# Patient Record
Sex: FEMALE | Race: WHITE | NOT HISPANIC OR LATINO | Employment: OTHER | URBAN - METROPOLITAN AREA
[De-identification: names, ages, dates, MRNs, and addresses within clinical notes are randomized per-mention and may not be internally consistent; named-entity substitution may affect disease eponyms.]

---

## 2017-05-05 ENCOUNTER — RECORDS - HEALTHEAST (OUTPATIENT)
Dept: LAB | Facility: CLINIC | Age: 77
End: 2017-05-05

## 2017-05-05 LAB
CHOLEST SERPL-MCNC: 286 MG/DL
FASTING STATUS PATIENT QL REPORTED: NO
HDLC SERPL-MCNC: 34 MG/DL
LDLC SERPL CALC-MCNC: 182 MG/DL
LDLC SERPL CALC-MCNC: ABNORMAL MG/DL
TRIGL SERPL-MCNC: 471 MG/DL

## 2018-05-07 ENCOUNTER — RECORDS - HEALTHEAST (OUTPATIENT)
Dept: LAB | Facility: CLINIC | Age: 78
End: 2018-05-07

## 2018-05-07 LAB
ALBUMIN SERPL-MCNC: 3.6 G/DL (ref 3.5–5)
ALP SERPL-CCNC: 89 U/L (ref 45–120)
ALT SERPL W P-5'-P-CCNC: 26 U/L (ref 0–45)
ANION GAP SERPL CALCULATED.3IONS-SCNC: 12 MMOL/L (ref 5–18)
AST SERPL W P-5'-P-CCNC: 23 U/L (ref 0–40)
BILIRUB SERPL-MCNC: 0.5 MG/DL (ref 0–1)
BUN SERPL-MCNC: 19 MG/DL (ref 8–28)
CALCIUM SERPL-MCNC: 9.4 MG/DL (ref 8.5–10.5)
CHLORIDE BLD-SCNC: 108 MMOL/L (ref 98–107)
CHOLEST SERPL-MCNC: 317 MG/DL
CO2 SERPL-SCNC: 22 MMOL/L (ref 22–31)
CREAT SERPL-MCNC: 1.19 MG/DL (ref 0.6–1.1)
FASTING STATUS PATIENT QL REPORTED: NO
GFR SERPL CREATININE-BSD FRML MDRD: 44 ML/MIN/1.73M2
GLUCOSE BLD-MCNC: 112 MG/DL (ref 70–125)
HDLC SERPL-MCNC: 36 MG/DL
LDLC SERPL CALC-MCNC: 186 MG/DL
LDLC SERPL CALC-MCNC: ABNORMAL MG/DL
POTASSIUM BLD-SCNC: 4.3 MMOL/L (ref 3.5–5)
PROT SERPL-MCNC: 7.5 G/DL (ref 6–8)
SODIUM SERPL-SCNC: 142 MMOL/L (ref 136–145)
TRIGL SERPL-MCNC: 581 MG/DL

## 2018-09-27 ENCOUNTER — HOSPITAL ENCOUNTER (OUTPATIENT)
Dept: MAMMOGRAPHY | Facility: CLINIC | Age: 78
Discharge: HOME | End: 2018-09-27
Attending: FAMILY MEDICINE

## 2018-09-27 DIAGNOSIS — Z12.31 VISIT FOR SCREENING MAMMOGRAM: ICD-10-CM

## 2019-05-09 ENCOUNTER — RECORDS - HEALTHEAST (OUTPATIENT)
Dept: LAB | Facility: CLINIC | Age: 79
End: 2019-05-09

## 2019-05-09 LAB
ALBUMIN SERPL-MCNC: 3.4 G/DL (ref 3.5–5)
ALP SERPL-CCNC: 81 U/L (ref 45–120)
ALT SERPL W P-5'-P-CCNC: 20 U/L (ref 0–45)
ANION GAP SERPL CALCULATED.3IONS-SCNC: 11 MMOL/L (ref 5–18)
AST SERPL W P-5'-P-CCNC: 22 U/L (ref 0–40)
BILIRUB SERPL-MCNC: 0.6 MG/DL (ref 0–1)
BUN SERPL-MCNC: 13 MG/DL (ref 8–28)
CALCIUM SERPL-MCNC: 9.6 MG/DL (ref 8.5–10.5)
CHLORIDE BLD-SCNC: 108 MMOL/L (ref 98–107)
CHOLEST SERPL-MCNC: 261 MG/DL
CO2 SERPL-SCNC: 22 MMOL/L (ref 22–31)
CREAT SERPL-MCNC: 1.25 MG/DL (ref 0.6–1.1)
ERYTHROCYTE [DISTWIDTH] IN BLOOD BY AUTOMATED COUNT: 12.7 % (ref 11–14.5)
FASTING STATUS PATIENT QL REPORTED: YES
GFR SERPL CREATININE-BSD FRML MDRD: 42 ML/MIN/1.73M2
GLUCOSE BLD-MCNC: 115 MG/DL (ref 70–125)
HCT VFR BLD AUTO: 42.2 % (ref 35–47)
HDLC SERPL-MCNC: 34 MG/DL
HGB BLD-MCNC: 14.1 G/DL (ref 12–16)
LDLC SERPL CALC-MCNC: 174 MG/DL
MCH RBC QN AUTO: 31 PG (ref 27–34)
MCHC RBC AUTO-ENTMCNC: 33.4 G/DL (ref 32–36)
MCV RBC AUTO: 93 FL (ref 80–100)
PLATELET # BLD AUTO: 291 THOU/UL (ref 140–440)
PMV BLD AUTO: 10.9 FL (ref 8.5–12.5)
POTASSIUM BLD-SCNC: 4.5 MMOL/L (ref 3.5–5)
PROT SERPL-MCNC: 6.8 G/DL (ref 6–8)
RBC # BLD AUTO: 4.55 MILL/UL (ref 3.8–5.4)
SODIUM SERPL-SCNC: 141 MMOL/L (ref 136–145)
TRIGL SERPL-MCNC: 266 MG/DL
WBC: 9.6 THOU/UL (ref 4–11)

## 2019-05-10 LAB — HBA1C MFR BLD: 6.1 % (ref 4.2–6.1)

## 2019-10-08 ENCOUNTER — RECORDS - HEALTHEAST (OUTPATIENT)
Dept: LAB | Facility: CLINIC | Age: 79
End: 2019-10-08

## 2019-10-08 LAB
CHOLEST SERPL-MCNC: 253 MG/DL
FASTING STATUS PATIENT QL REPORTED: YES
HDLC SERPL-MCNC: 44 MG/DL
LDLC SERPL CALC-MCNC: 174 MG/DL
TRIGL SERPL-MCNC: 177 MG/DL

## 2020-03-06 ENCOUNTER — RECORDS - HEALTHEAST (OUTPATIENT)
Dept: LAB | Facility: CLINIC | Age: 80
End: 2020-03-06

## 2020-03-06 LAB
ALBUMIN SERPL-MCNC: 3.6 G/DL (ref 3.5–5)
ALP SERPL-CCNC: 82 U/L (ref 45–120)
ALT SERPL W P-5'-P-CCNC: 21 U/L (ref 0–45)
ANION GAP SERPL CALCULATED.3IONS-SCNC: 13 MMOL/L (ref 5–18)
AST SERPL W P-5'-P-CCNC: 22 U/L (ref 0–40)
BILIRUB SERPL-MCNC: 0.7 MG/DL (ref 0–1)
BNP SERPL-MCNC: 82 PG/ML (ref 0–144)
BUN SERPL-MCNC: 15 MG/DL (ref 8–28)
CALCIUM SERPL-MCNC: 9.6 MG/DL (ref 8.5–10.5)
CHLORIDE BLD-SCNC: 106 MMOL/L (ref 98–107)
CO2 SERPL-SCNC: 21 MMOL/L (ref 22–31)
CREAT SERPL-MCNC: 1.36 MG/DL (ref 0.6–1.1)
GFR SERPL CREATININE-BSD FRML MDRD: 38 ML/MIN/1.73M2
GLUCOSE BLD-MCNC: 125 MG/DL (ref 70–125)
MAGNESIUM SERPL-MCNC: 1.6 MG/DL (ref 1.8–2.6)
PHOSPHATE SERPL-MCNC: 2.6 MG/DL (ref 2.5–4.5)
POTASSIUM BLD-SCNC: 4.2 MMOL/L (ref 3.5–5)
PROT SERPL-MCNC: 7.1 G/DL (ref 6–8)
SODIUM SERPL-SCNC: 140 MMOL/L (ref 136–145)
TSH SERPL DL<=0.005 MIU/L-ACNC: 1.55 UIU/ML (ref 0.3–5)

## 2020-07-21 ENCOUNTER — RECORDS - HEALTHEAST (OUTPATIENT)
Dept: LAB | Facility: CLINIC | Age: 80
End: 2020-07-21

## 2020-07-21 LAB
ANION GAP SERPL CALCULATED.3IONS-SCNC: 9 MMOL/L (ref 5–18)
BUN SERPL-MCNC: 18 MG/DL (ref 8–28)
CALCIUM SERPL-MCNC: 9.7 MG/DL (ref 8.5–10.5)
CHLORIDE BLD-SCNC: 108 MMOL/L (ref 98–107)
CHOLEST SERPL-MCNC: 298 MG/DL
CO2 SERPL-SCNC: 23 MMOL/L (ref 22–31)
CREAT SERPL-MCNC: 1.24 MG/DL (ref 0.6–1.1)
FASTING STATUS PATIENT QL REPORTED: YES
GFR SERPL CREATININE-BSD FRML MDRD: 42 ML/MIN/1.73M2
GLUCOSE BLD-MCNC: 101 MG/DL (ref 70–125)
HDLC SERPL-MCNC: 38 MG/DL
LDLC SERPL CALC-MCNC: 213 MG/DL
POTASSIUM BLD-SCNC: 4.3 MMOL/L (ref 3.5–5)
SODIUM SERPL-SCNC: 140 MMOL/L (ref 136–145)
TRIGL SERPL-MCNC: 234 MG/DL

## 2021-01-29 ENCOUNTER — RECORDS - HEALTHEAST (OUTPATIENT)
Dept: LAB | Facility: CLINIC | Age: 81
End: 2021-01-29

## 2021-01-29 LAB
ALBUMIN SERPL-MCNC: 3.8 G/DL (ref 3.5–5)
ALP SERPL-CCNC: 87 U/L (ref 45–120)
ALT SERPL W P-5'-P-CCNC: 22 U/L (ref 0–45)
ANION GAP SERPL CALCULATED.3IONS-SCNC: 11 MMOL/L (ref 5–18)
AST SERPL W P-5'-P-CCNC: 20 U/L (ref 0–40)
BILIRUB SERPL-MCNC: 0.7 MG/DL (ref 0–1)
BUN SERPL-MCNC: 18 MG/DL (ref 8–28)
CALCIUM SERPL-MCNC: 9.3 MG/DL (ref 8.5–10.5)
CHLORIDE BLD-SCNC: 105 MMOL/L (ref 98–107)
CO2 SERPL-SCNC: 24 MMOL/L (ref 22–31)
CREAT SERPL-MCNC: 1.39 MG/DL (ref 0.6–1.1)
GFR SERPL CREATININE-BSD FRML MDRD: 37 ML/MIN/1.73M2
GLUCOSE BLD-MCNC: 116 MG/DL (ref 70–125)
POTASSIUM BLD-SCNC: 4.5 MMOL/L (ref 3.5–5)
PROT SERPL-MCNC: 7.3 G/DL (ref 6–8)
SODIUM SERPL-SCNC: 140 MMOL/L (ref 136–145)

## 2021-03-10 ENCOUNTER — RECORDS - HEALTHEAST (OUTPATIENT)
Dept: ADMINISTRATIVE | Facility: OTHER | Age: 81
End: 2021-03-10

## 2021-03-11 ENCOUNTER — HOSPITAL ENCOUNTER (OUTPATIENT)
Dept: CARDIOLOGY | Facility: CLINIC | Age: 81
Discharge: HOME | End: 2021-03-11

## 2021-03-11 DIAGNOSIS — R00.1 BRADYCARDIA: ICD-10-CM

## 2021-03-16 ENCOUNTER — RECORDS - HEALTHEAST (OUTPATIENT)
Dept: ADMINISTRATIVE | Facility: OTHER | Age: 81
End: 2021-03-16

## 2021-03-16 ENCOUNTER — AMBULATORY - HEALTHEAST (OUTPATIENT)
Dept: CARDIOLOGY | Facility: CLINIC | Age: 81
End: 2021-03-16

## 2021-03-17 ENCOUNTER — OFFICE VISIT - HEALTHEAST (OUTPATIENT)
Dept: CARDIOLOGY | Facility: CLINIC | Age: 81
End: 2021-03-17

## 2021-03-17 DIAGNOSIS — R06.09 DYSPNEA ON EXERTION: ICD-10-CM

## 2021-03-17 RX ORDER — MECOBALAMIN 5000 MCG
15 TABLET,DISINTEGRATING ORAL DAILY
Status: SHIPPED | COMMUNITY
Start: 2020-03-12

## 2021-03-17 RX ORDER — LOSARTAN POTASSIUM 50 MG/1
1 TABLET ORAL DAILY
Status: SHIPPED | COMMUNITY
Start: 2021-03-10 | End: 2022-09-23

## 2021-03-17 ASSESSMENT — MIFFLIN-ST. JEOR: SCORE: 1462.45

## 2021-03-26 ENCOUNTER — HOSPITAL ENCOUNTER (OUTPATIENT)
Dept: NUCLEAR MEDICINE | Facility: CLINIC | Age: 81
Discharge: HOME | End: 2021-03-26
Attending: INTERNAL MEDICINE

## 2021-03-26 ENCOUNTER — HOSPITAL ENCOUNTER (OUTPATIENT)
Dept: CARDIOLOGY | Facility: CLINIC | Age: 81
Discharge: HOME | End: 2021-03-26
Attending: INTERNAL MEDICINE

## 2021-03-26 LAB
CV STRESS CURRENT BP HE: NORMAL
CV STRESS CURRENT HR HE: 102
CV STRESS CURRENT HR HE: 104
CV STRESS CURRENT HR HE: 105
CV STRESS CURRENT HR HE: 106
CV STRESS CURRENT HR HE: 106
CV STRESS CURRENT HR HE: 107
CV STRESS CURRENT HR HE: 108
CV STRESS CURRENT HR HE: 109
CV STRESS CURRENT HR HE: 112
CV STRESS CURRENT HR HE: 112
CV STRESS CURRENT HR HE: 113
CV STRESS CURRENT HR HE: 114
CV STRESS CURRENT HR HE: 90
CV STRESS CURRENT HR HE: 95
CV STRESS CURRENT HR HE: 99
CV STRESS DEVIATION TIME HE: NORMAL
CV STRESS ECHO PERCENT HR HE: NORMAL
CV STRESS EXERCISE STAGE HE: NORMAL
CV STRESS FINAL RESTING BP HE: NORMAL
CV STRESS FINAL RESTING HR HE: 99
CV STRESS MAX HR HE: 114
CV STRESS MAX TREADMILL GRADE HE: 0
CV STRESS MAX TREADMILL SPEED HE: 0
CV STRESS PEAK DIA BP HE: NORMAL
CV STRESS PEAK SYS BP HE: NORMAL
CV STRESS PHASE HE: NORMAL
CV STRESS PROTOCOL HE: NORMAL
CV STRESS RESTING PT POSITION HE: NORMAL
CV STRESS RESTING PT POSITION HE: NORMAL
CV STRESS ST DEVIATION AMOUNT HE: NORMAL
CV STRESS ST DEVIATION ELEVATION HE: NORMAL
CV STRESS ST EVELATION AMOUNT HE: NORMAL
CV STRESS TEST TYPE HE: NORMAL
CV STRESS TOTAL STAGE TIME MIN 1 HE: NORMAL
RATE PRESSURE PRODUCT: NORMAL
STRESS ECHO BASELINE DIASTOLIC HE: 72
STRESS ECHO BASELINE HR: 93
STRESS ECHO BASELINE SYSTOLIC BP: 158
STRESS ECHO CALCULATED PERCENT HR: 80 %
STRESS ECHO LAST STRESS DIASTOLIC BP: 88
STRESS ECHO LAST STRESS HR: 105
STRESS ECHO LAST STRESS SYSTOLIC BP: 128
STRESS ECHO TARGET HR: 142

## 2021-04-15 ENCOUNTER — OFFICE VISIT - HEALTHEAST (OUTPATIENT)
Dept: CARDIOLOGY | Facility: CLINIC | Age: 81
End: 2021-04-15

## 2021-04-15 DIAGNOSIS — I49.3 PVC'S (PREMATURE VENTRICULAR CONTRACTIONS): ICD-10-CM

## 2021-04-15 DIAGNOSIS — E78.5 HYPERLIPIDEMIA LDL GOAL <70: ICD-10-CM

## 2021-04-15 RX ORDER — ATENOLOL 50 MG/1
50 TABLET ORAL DAILY
Qty: 90 TABLET | Refills: 3 | Status: SHIPPED
Start: 2021-04-15 | End: 2023-04-20

## 2021-04-15 ASSESSMENT — MIFFLIN-ST. JEOR: SCORE: 1453.37

## 2021-04-19 ENCOUNTER — COMMUNICATION - HEALTHEAST (OUTPATIENT)
Dept: CARDIOLOGY | Facility: CLINIC | Age: 81
End: 2021-04-19

## 2021-04-20 ENCOUNTER — COMMUNICATION - HEALTHEAST (OUTPATIENT)
Dept: ENDOCRINOLOGY | Facility: CLINIC | Age: 81
End: 2021-04-20

## 2021-04-21 ENCOUNTER — COMMUNICATION - HEALTHEAST (OUTPATIENT)
Dept: CARDIOLOGY | Facility: CLINIC | Age: 81
End: 2021-04-21

## 2021-04-29 ENCOUNTER — HOSPITAL ENCOUNTER (OUTPATIENT)
Dept: CARDIOLOGY | Facility: CLINIC | Age: 81
Discharge: HOME | End: 2021-04-29
Attending: INTERNAL MEDICINE

## 2021-04-29 DIAGNOSIS — I49.3 PVC'S (PREMATURE VENTRICULAR CONTRACTIONS): ICD-10-CM

## 2021-05-24 ENCOUNTER — RECORDS - HEALTHEAST (OUTPATIENT)
Dept: ADMINISTRATIVE | Facility: CLINIC | Age: 81
End: 2021-05-24

## 2021-05-25 ENCOUNTER — RECORDS - HEALTHEAST (OUTPATIENT)
Dept: ADMINISTRATIVE | Facility: CLINIC | Age: 81
End: 2021-05-25

## 2021-05-26 ENCOUNTER — RECORDS - HEALTHEAST (OUTPATIENT)
Dept: ADMINISTRATIVE | Facility: CLINIC | Age: 81
End: 2021-05-26

## 2021-05-27 ENCOUNTER — RECORDS - HEALTHEAST (OUTPATIENT)
Dept: ADMINISTRATIVE | Facility: CLINIC | Age: 81
End: 2021-05-27

## 2021-05-28 ENCOUNTER — RECORDS - HEALTHEAST (OUTPATIENT)
Dept: ADMINISTRATIVE | Facility: CLINIC | Age: 81
End: 2021-05-28

## 2021-05-29 ENCOUNTER — RECORDS - HEALTHEAST (OUTPATIENT)
Dept: ADMINISTRATIVE | Facility: CLINIC | Age: 81
End: 2021-05-29

## 2021-05-30 ENCOUNTER — RECORDS - HEALTHEAST (OUTPATIENT)
Dept: ADMINISTRATIVE | Facility: CLINIC | Age: 81
End: 2021-05-30

## 2021-06-02 ENCOUNTER — RECORDS - HEALTHEAST (OUTPATIENT)
Dept: ADMINISTRATIVE | Facility: CLINIC | Age: 81
End: 2021-06-02

## 2021-06-05 VITALS
RESPIRATION RATE: 16 BRPM | WEIGHT: 207 LBS | HEART RATE: 120 BPM | HEIGHT: 68 IN | BODY MASS INDEX: 31.37 KG/M2 | DIASTOLIC BLOOD PRESSURE: 82 MMHG | SYSTOLIC BLOOD PRESSURE: 130 MMHG

## 2021-06-05 VITALS
BODY MASS INDEX: 31.07 KG/M2 | SYSTOLIC BLOOD PRESSURE: 130 MMHG | HEIGHT: 68 IN | HEART RATE: 60 BPM | DIASTOLIC BLOOD PRESSURE: 86 MMHG | RESPIRATION RATE: 16 BRPM | WEIGHT: 205 LBS

## 2021-06-16 NOTE — TELEPHONE ENCOUNTER
Lorraine, got this message from the provider for REpatha. Please check if anything is needed. I have gotten nothing from Pharm.

## 2021-06-16 NOTE — TELEPHONE ENCOUNTER
----- Message from Theresa Ortiz MD sent at 4/18/2021  4:47 PM CDT -----  I sent in a script for repatha  for this patient with hyperlipidemia, cad and statin intolerance - could help with prior auth

## 2021-06-16 NOTE — TELEPHONE ENCOUNTER
Viki called me back stating that she has decided not to proceed with this medication and she is pretty darn sure should wouldn't qualify for the laina due to her income.

## 2021-06-17 NOTE — TELEPHONE ENCOUNTER
Telephone Encounter by Lorraine Daley at 4/19/2021 11:35 AM     Author: Lorraine Daley Service: -- Author Type: Financial Resource Guide    Filed: 4/19/2021 11:39 AM Encounter Date: 4/19/2021 Status: Signed    : Lorraine Daley (Financial Resource Guide)       PA Initiation  Medication: repatha sureclick 140mg/ml  QTY: 2 pens for 28 days  Insurance Company: [employer] Part D ([employer])  Pharmacy Filing Rx: pending  Filling Pharmacy Phone: [email or web address]  Filling Pharmacy Fax: na  Start Date: 4/19/21  Additional Information: jaden

## 2021-06-17 NOTE — TELEPHONE ENCOUNTER
Telephone Encounter by Lorraine Daley at 4/19/2021  1:21 PM     Author: Lorraine Daley Service: -- Author Type: Financial Resource Guide    Filed: 4/20/2021 12:28 PM Encounter Date: 4/19/2021 Status: Addendum    : Lorraine Daley (Financial Resource Guide)    Related Notes: Original Note by Lorraine Daley (Financial Resource Guide) filed at 4/19/2021  3:14 PM       Prior Authorization Approval  Medication: Repatha sureclick 140mg/ml  Qty: 2 pens for 28 days  Effective Dates: 3/20/21 - 4/18/24  Reference Number: na  Insurance Company: UC[employer] PART D ([employer])  [facility]: [facility]  Expected Copay: $445.00  Copay Card Available: not elg  Foundation Assistance Needed/Available: Yes  Patient Assistance Program Needed: No  Patient Notified? Yes, LM [facility]  Pharmacy Notified? Yes

## 2021-06-18 NOTE — PATIENT INSTRUCTIONS - HE
Patient Instructions by Theresa Ortiz MD at 4/15/2021 11:30 AM     Author: Theresa Ortiz MD Service: -- Author Type: Physician    Filed: 4/15/2021 11:51 AM Encounter Date: 4/15/2021 Status: Signed    : Theresa Ortiz MD (Physician)       MsGenny Viki MARSHALL Jakob,     It was a pleasure to see you in the office today. My recommendations for you include:   1. PCSK9 inhibitor for cholesterol  2. Start atenolol 50 mg daily and stop toprol XL      Please do not hesitate to call the [facility] clinic with any questions or concerns at [phone].    Sincerely,

## 2021-06-26 ENCOUNTER — HEALTH MAINTENANCE LETTER (OUTPATIENT)
Age: 81
End: 2021-06-26

## 2021-06-30 NOTE — PROGRESS NOTES
Progress Notes by Theresa Ortiz MD at 4/15/2021 11:30 AM     Author: Theresa Ortiz MD Service: -- Author Type: Physician    Filed: 4/18/2021  4:55 PM Encounter Date: 4/15/2021 Status: Signed    : Theresa Ortiz MD (Physician)           Thank you, Dr. Bundy, for asking us to see Viki Robert at the [facility].      Assessment/Recommendations   Assessment:    1.  Premature ventricular contractions:   Increased burden on recent holter after stopping her beta blocker.  She did not tolerate metoprolol . Will resume atenolol at a lower dose.  She had been on 100mg and will start 50 mg.  No bradcardia noted on holter.  Will repeat holter after start the atenolol.   2. Hypertension: well controlled  3. Dyslipidemia: severe hyperlipidemia with statin intolerance.   Recommend considering PCSK9 inhibitor  4. Coronary artery disease with CT cardiac calcium score of 23 2015  5. Obesity     Plan:  1. Start atenolol 50 mg daily and repeat holter  2. start PCSK 9 inhibitor.  Sent script for repatha  3. Follow up in one year       History of Present Illness    Ms. Viki Robert is a 80 y.o. female with history of PVCs, severe dyslipidemia with statin and zetia intolerance and hypertension who I am seeing for a follow up visit.   She noticed bradycardia with heart rates in the 40s and occasional irregular heart beat so she stopped her atenolol.   Holter monitor on 3/15/21 showed very frequent PVCs 16% burden with ventricular bigeminy.   PVC morphology consistent with origin from the right ventricle.  Short runs of atrial tachycardia were also seen.   7/21/20 lipids show , HDL 38, .  She gained weight with zetia and did not tolerate statins.  I started her on low dose metoprolol and she suffered from nightmares.  Lexiscan nuclear stress test was negative for inducible ischemia. CT cardiac calcium score of 23 on 1/26/2015.  No complaints of chest pain.             Physical Examination Review of Systems   Vitals:    04/15/21 1127   BP: 130/86   Pulse: 60   Resp: 16     Body mass index is 31.17 kg/m .  Wt Readings from Last 3 Encounters:   04/15/21 205 lb (93 kg)   03/17/21 207 lb (93.9 kg)       General Appearance:   alert, no apparent distress   HEENT:  no scleral icterus; the mucous membranes are pink and moist                                  Neck: No jvd   Chest: the spine is straight and the chest is symmetric   Lungs:   respirations unlabored; the lungs are clear to auscultation   Cardiovascular:   regular rhythm with normal first and second heart sounds and no murmurs or gallops   Abdomen:  no organomegaly, masses, bruits, or tenderness; bowel sounds are present   Extremities: no edema   Skin: no xanthelasma    General: WNL  Eyes: WNL  Ears/Nose/Throat: WNL  Lungs: Shortness of Breath  Heart: Shortness of Breath with activity, Irregular Heartbeat, Leg Swelling  Stomach: WNL  Bladder: WNL  Muscle/Joints: Muscle Weakness  Skin: WNL  Nervous System: WNL  Mental Health: WNL     Blood: WNL     Medical History  Surgical History Family History Social History   Hyperlipidemia    Hypertension    PVC Past Surgical History:   Procedure Laterality Date   ? BREAST BIOPSY Left 2000    Family History   Problem Relation Age of Onset   ? Breast cancer Mother 90   ? Multiple myeloma Father 75   ? Breast cancer Paternal Grandmother 100    Social History     Socioeconomic History   ? Marital status: Single     Spouse name: Not on file   ? Number of children: Not on file   ? Years of education: Not on file   ? Highest education level: Not on file   Occupational History   ? Not on file   Social Needs   ? Financial resource strain: Not on file   ? Food insecurity     Worry: Not on file     Inability: Not on file   ? Transportation needs     Medical: Not on file     Non-medical: Not on file   Tobacco Use   ? Smoking status: Never Smoker   ? Smokeless tobacco: Never Used   Substance and  Sexual Activity   ? Alcohol use: Not on file   ? Drug use: Never   ? Sexual activity: Not on file   Lifestyle   ? Physical activity     Days per week: Not on file     Minutes per session: Not on file   ? Stress: Not on file   Relationships   ? Social connections     Talks on phone: Not on file     Gets together: Not on file     Attends Zoroastrian service: Not on file     Active member of club or organization: Not on file     Attends meetings of clubs or organizations: Not on file     Relationship status: Not on file   ? Intimate partner violence     Fear of current or ex partner: Not on file     Emotionally abused: Not on file     Physically abused: Not on file     Forced sexual activity: Not on file   Other Topics Concern   ? Not on file   Social History Narrative   ? Not on file          Medications  Allergies   Current Outpatient Medications   Medication Sig Dispense Refill   ? aspirin 81 MG EC tablet Take 81 mg by mouth daily.     ? cholecalciferol, vitamin D3, 1,000 unit (25 mcg) tablet Take 1,000 Units by mouth daily.     ? lansoprazole (PREVACID) 15 MG capsule Take 15 mg by mouth daily.     ? losartan (COZAAR) 50 MG tablet Take 1 tablet by mouth daily.     ? multivitamin (ONE A DAY) per tablet Take 1 tablet by mouth daily.     ? atenoloL (TENORMIN) 50 MG tablet Take 1 tablet (50 mg total) by mouth daily. 90 tablet 3   ? evolocumab (REPATHA SURECLICK) 140 mg/mL PnIj Inject 1 click under the skin every 14 (fourteen) days. 10 Syringe 5     No current facility-administered medications for this visit.       Allergies   Allergen Reactions   ? Atorvastatin      Achy & weak   ? Hydrochlorothiazide      Light headed   ? Lodine [Etodolac]    ? Naprosyn [Naproxen]      Ankle swelling   ? Pravachol [Pravastatin]      Muscle aches & weakness         Lab Results    Chemistry/lipid CBC Cardiac Enzymes/BNP/TSH/INR   Lab Results   Component Value Date    CHOL 298 (H) 07/21/2020    HDL 38 (L) 07/21/2020    LDLCALC 213 (H)  07/21/2020    TRIG 234 (H) 07/21/2020    CREATININE 1.39 (H) 01/29/2021    BUN 18 01/29/2021    K 4.5 01/29/2021     01/29/2021     01/29/2021    CO2 24 01/29/2021    Lab Results   Component Value Date    WBC 9.6 05/09/2019    HGB 14.1 05/09/2019    HCT 42.2 05/09/2019    MCV 93 05/09/2019     05/09/2019    Lab Results   Component Value Date    CKTOTAL 76 02/09/2015    BNP 82 03/06/2020    TSH 1.55 03/06/2020

## 2021-06-30 NOTE — PROGRESS NOTES
Progress Notes by Theresa Ortiz MD at 3/17/2021  3:10 PM     Author: Theresa Ortiz MD Service: -- Author Type: Physician    Filed: 3/27/2021  4:29 PM Encounter Date: 3/17/2021 Status: Signed    : Theresa Ortiz MD (Physician)           Thank you, Dr. Bundy, for asking us to see Viki Robert at the [facility].      Assessment/Recommendations   Assessment:    1.  Premature ventricular contractions:   Increased burden on recent holter after stopping her beta blocker.  Bradycardia she noted was likely due to the bigeminy.   She did not have any bradycardia on her holter.  Recommend resuming beta blocker. If symptomatic we could consider referral to EP for ablation.   2. Hypertension: well controlled  3. Dyslipidemia: severe hyperlipidemia with statin intolerance.   Recommend considering PCSK9 inhibitor  4. Dyspnea on exertion with normal PFTs and no significant structural heart disease on echo  5. Obesity    Plan:  1. Start toprol XL 25 mg daily  2. Lexiscan nuclear stress test  3. Follow up in a month       History of Present Illness    Ms. Viki Robert is a 80 y.o. female with history of PVCs, severe dyslipidemia with statin and zetia intolerance and hypertension who I am seeing for an initial consultation.   She noticed bradycardia with heart rates in the 40s and occasional irregular heart beat so she stopped her atenolol.   Holter monitor on 3/15/21 showed very frequent PVCs 16% burden with ventricular bigeminy.   PVC morphology consistent with origin from the right ventricle.  Short runs of atrial tachycardia were also seen.  She denies chest pain.  She has noted worsening shortness of breath over the past two years.    7/21/20 lipids show , HDL 38, .  She gained weight with zetia and did not tolerate statins.      [facility]      Date: 6/10/2020     Referring Physician: Bulmaro Powers MD    Indication:  Dyspnea on exertion, heart palpitations     CONCLUSIONS:  1.  Normal left ventricular size with normal systolic performance with   visually estimated ejection fraction of 60% to 65%.   2.  No regional wall motion abnormalities are noted.   3.  Normal right ventricular size and systolic performance.   4.  Mild senile aortic valve sclerosis without stenosis.   5.  Mild mitral annular calcification with trace insufficiency.        Physical Examination Review of Systems   Vitals:    03/17/21 1512   BP: 130/82   Pulse: (!) 120   Resp: 16     Body mass index is 31.47 kg/m .  Wt Readings from Last 3 Encounters:   03/17/21 207 lb (93.9 kg)       General Appearance:   alert, no apparent distress   HEENT:  no scleral icterus; the mucous membranes are pink and moist                                  Neck: No jvd   Chest: the spine is straight and the chest is symmetric   Lungs:   respirations unlabored; the lungs are clear to auscultation   Cardiovascular:   regular rhythm with normal first and second heart sounds and no murmurs or gallops   Abdomen:  no organomegaly, masses, bruits, or tenderness; bowel sounds are present   Extremities: no cyanosis, clubbing, or edema   Skin: no xanthelasma    General: WNL  Eyes: WNL  Ears/Nose/Throat: WNL  Lungs: Shortness of Breath  Heart: Shortness of Breath with activity, Irregular Heartbeat  Stomach: WNL  Bladder: Frequent Urination at Night  Muscle/Joints: Muscle Weakness  Skin: WNL  Nervous System: WNL  Mental Health: WNL     Blood: WNL     Medical History  Surgical History Family History Social History   Hypertension  PVCs Past Surgical History:   Procedure Laterality Date   ? BREAST BIOPSY Left 2000    Family History   Problem Relation Age of Onset   ? Breast cancer Mother 90   ? Multiple myeloma Father 75   ? Breast cancer Paternal Grandmother 100    Social History     Socioeconomic History   ? Marital status: Single     Spouse name: Not on file   ? Number of children: Not on file    ? Years of education: Not on file   ? Highest education level: Not on file   Occupational History   ? Not on file   Social Needs   ? Financial resource strain: Not on file   ? Food insecurity     Worry: Not on file     Inability: Not on file   ? Transportation needs     Medical: Not on file     Non-medical: Not on file   Tobacco Use   ? Smoking status: Never Smoker   ? Smokeless tobacco: Never Used   Substance and Sexual Activity   ? Alcohol use: Not on file   ? Drug use: Never   ? Sexual activity: Not on file   Lifestyle   ? Physical activity     Days per week: Not on file     Minutes per session: Not on file   ? Stress: Not on file   Relationships   ? Social connections     Talks on phone: Not on file     Gets together: Not on file     Attends Denominational service: Not on file     Active member of club or organization: Not on file     Attends meetings of clubs or organizations: Not on file     Relationship status: Not on file   ? Intimate partner violence     Fear of current or ex partner: Not on file     Emotionally abused: Not on file     Physically abused: Not on file     Forced sexual activity: Not on file   Other Topics Concern   ? Not on file   Social History Narrative   ? Not on file          Medications  Allergies   Current Outpatient Medications   Medication Sig Dispense Refill   ? aspirin 81 MG EC tablet Take 81 mg by mouth daily.     ? cholecalciferol, vitamin D3, 1,000 unit (25 mcg) tablet Take 1,000 Units by mouth daily.     ? lansoprazole (PREVACID) 15 MG capsule Take 15 mg by mouth daily.     ? losartan (COZAAR) 50 MG tablet Take 1 tablet by mouth daily.     ? multivitamin (ONE A DAY) per tablet Take 1 tablet by mouth daily.     ? atenoloL (TENORMIN) 100 MG tablet Take 100 mg by mouth daily.     ? ezetimibe (ZETIA) 10 mg tablet Take 10 mg by mouth daily.     ? metoprolol succinate (TOPROL-XL) 25 MG Take 1 tablet (25 mg total) by mouth daily. 30 tablet 5     No current facility-administered  medications for this visit.       Allergies   Allergen Reactions   ? Atorvastatin      Achy & weak   ? Hydrochlorothiazide      Light headed   ? Lodine [Etodolac]    ? Naprosyn [Naproxen]      Ankle swelling   ? Pravachol [Pravastatin]      Muscle aches & weakness         Lab Results    Chemistry/lipid CBC Cardiac Enzymes/BNP/TSH/INR   Lab Results   Component Value Date    CHOL 298 (H) 07/21/2020    HDL 38 (L) 07/21/2020    LDLCALC 213 (H) 07/21/2020    TRIG 234 (H) 07/21/2020    CREATININE 1.39 (H) 01/29/2021    BUN 18 01/29/2021    K 4.5 01/29/2021     01/29/2021     01/29/2021    CO2 24 01/29/2021    Lab Results   Component Value Date    WBC 9.6 05/09/2019    HGB 14.1 05/09/2019    HCT 42.2 05/09/2019    MCV 93 05/09/2019     05/09/2019    Lab Results   Component Value Date    CKTOTAL 76 02/09/2015    BNP 82 03/06/2020    TSH 1.55 03/06/2020

## 2021-07-13 ENCOUNTER — RECORDS - HEALTHEAST (OUTPATIENT)
Dept: ADMINISTRATIVE | Facility: CLINIC | Age: 81
End: 2021-07-13

## 2021-07-21 ENCOUNTER — RECORDS - HEALTHEAST (OUTPATIENT)
Dept: ADMINISTRATIVE | Facility: CLINIC | Age: 81
End: 2021-07-21

## 2021-07-22 ENCOUNTER — RECORDS - HEALTHEAST (OUTPATIENT)
Dept: SCHEDULING | Facility: CLINIC | Age: 81
End: 2021-07-22

## 2021-07-22 DIAGNOSIS — Z12.31 OTHER SCREENING MAMMOGRAM: ICD-10-CM

## 2021-09-03 ENCOUNTER — LAB REQUISITION (OUTPATIENT)
Dept: LAB | Facility: CLINIC | Age: 81
End: 2021-09-03

## 2021-09-03 DIAGNOSIS — I12.9 HYPERTENSIVE CHRONIC KIDNEY DISEASE WITH STAGE 1 THROUGH STAGE 4 CHRONIC KIDNEY DISEASE, OR UNSPECIFIED CHRONIC KIDNEY DISEASE: ICD-10-CM

## 2021-09-03 DIAGNOSIS — E78.5 HYPERLIPIDEMIA, UNSPECIFIED: ICD-10-CM

## 2021-09-03 DIAGNOSIS — E55.9 VITAMIN D DEFICIENCY, UNSPECIFIED: ICD-10-CM

## 2021-09-03 LAB
ALBUMIN SERPL-MCNC: 3.6 G/DL (ref 3.5–5)
ALP SERPL-CCNC: 72 U/L (ref 45–120)
ALT SERPL W P-5'-P-CCNC: 18 U/L (ref 0–45)
ANION GAP SERPL CALCULATED.3IONS-SCNC: 13 MMOL/L (ref 5–18)
AST SERPL W P-5'-P-CCNC: 19 U/L (ref 0–40)
BILIRUB SERPL-MCNC: 0.5 MG/DL (ref 0–1)
BUN SERPL-MCNC: 18 MG/DL (ref 8–28)
CALCIUM SERPL-MCNC: 9.7 MG/DL (ref 8.5–10.5)
CHLORIDE BLD-SCNC: 108 MMOL/L (ref 98–107)
CHOLEST SERPL-MCNC: 290 MG/DL
CO2 SERPL-SCNC: 20 MMOL/L (ref 22–31)
CREAT SERPL-MCNC: 1.41 MG/DL (ref 0.6–1.1)
FASTING STATUS PATIENT QL REPORTED: ABNORMAL
GFR SERPL CREATININE-BSD FRML MDRD: 35 ML/MIN/1.73M2
GLUCOSE BLD-MCNC: 102 MG/DL (ref 70–125)
HDLC SERPL-MCNC: 38 MG/DL
LDLC SERPL CALC-MCNC: 187 MG/DL
LDLC SERPL CALC-MCNC: ABNORMAL MG/DL
POTASSIUM BLD-SCNC: 4.7 MMOL/L (ref 3.5–5)
PROT SERPL-MCNC: 6.9 G/DL (ref 6–8)
SODIUM SERPL-SCNC: 141 MMOL/L (ref 136–145)
TRIGL SERPL-MCNC: 438 MG/DL

## 2021-09-03 PROCEDURE — 80061 LIPID PANEL: CPT | Performed by: STUDENT IN AN ORGANIZED HEALTH CARE EDUCATION/TRAINING PROGRAM

## 2021-09-03 PROCEDURE — 80053 COMPREHEN METABOLIC PANEL: CPT | Performed by: STUDENT IN AN ORGANIZED HEALTH CARE EDUCATION/TRAINING PROGRAM

## 2021-09-03 PROCEDURE — 83721 ASSAY OF BLOOD LIPOPROTEIN: CPT | Performed by: STUDENT IN AN ORGANIZED HEALTH CARE EDUCATION/TRAINING PROGRAM

## 2021-09-03 PROCEDURE — 82306 VITAMIN D 25 HYDROXY: CPT | Performed by: STUDENT IN AN ORGANIZED HEALTH CARE EDUCATION/TRAINING PROGRAM

## 2021-09-03 PROCEDURE — 36415 COLL VENOUS BLD VENIPUNCTURE: CPT | Performed by: STUDENT IN AN ORGANIZED HEALTH CARE EDUCATION/TRAINING PROGRAM

## 2021-09-06 LAB — DEPRECATED CALCIDIOL+CALCIFEROL SERPL-MC: 27 UG/L (ref 30–80)

## 2021-10-16 ENCOUNTER — HEALTH MAINTENANCE LETTER (OUTPATIENT)
Age: 81
End: 2021-10-16

## 2021-11-04 ENCOUNTER — LAB REQUISITION (OUTPATIENT)
Dept: LAB | Facility: CLINIC | Age: 81
End: 2021-11-04

## 2021-11-04 ENCOUNTER — TRANSFERRED RECORDS (OUTPATIENT)
Dept: HEALTH INFORMATION MANAGEMENT | Facility: CLINIC | Age: 81
End: 2021-11-04

## 2021-11-04 DIAGNOSIS — R29.6 REPEATED FALLS: ICD-10-CM

## 2021-11-04 LAB
ALBUMIN SERPL-MCNC: 3.6 G/DL (ref 3.5–5)
ALP SERPL-CCNC: 72 U/L (ref 45–120)
ALT SERPL W P-5'-P-CCNC: 15 U/L (ref 0–45)
ANION GAP SERPL CALCULATED.3IONS-SCNC: 11 MMOL/L (ref 5–18)
AST SERPL W P-5'-P-CCNC: 16 U/L (ref 0–40)
BILIRUB SERPL-MCNC: 0.6 MG/DL (ref 0–1)
BUN SERPL-MCNC: 24 MG/DL (ref 8–28)
CALCIUM SERPL-MCNC: 9.7 MG/DL (ref 8.5–10.5)
CHLORIDE BLD-SCNC: 108 MMOL/L (ref 98–107)
CO2 SERPL-SCNC: 21 MMOL/L (ref 22–31)
CREAT SERPL-MCNC: 1.49 MG/DL (ref 0.6–1.1)
GFR SERPL CREATININE-BSD FRML MDRD: 33 ML/MIN/1.73M2
GLUCOSE BLD-MCNC: 137 MG/DL (ref 70–125)
POTASSIUM BLD-SCNC: 4.4 MMOL/L (ref 3.5–5)
PROT SERPL-MCNC: 6.9 G/DL (ref 6–8)
SODIUM SERPL-SCNC: 140 MMOL/L (ref 136–145)

## 2021-11-04 PROCEDURE — 80053 COMPREHEN METABOLIC PANEL: CPT | Performed by: STUDENT IN AN ORGANIZED HEALTH CARE EDUCATION/TRAINING PROGRAM

## 2022-01-21 ENCOUNTER — HOSPITAL ENCOUNTER (OUTPATIENT)
Dept: MAMMOGRAPHY | Facility: CLINIC | Age: 82
Discharge: HOME | End: 2022-01-21
Attending: STUDENT IN AN ORGANIZED HEALTH CARE EDUCATION/TRAINING PROGRAM | Admitting: STUDENT IN AN ORGANIZED HEALTH CARE EDUCATION/TRAINING PROGRAM
Payer: COMMERCIAL

## 2022-01-21 DIAGNOSIS — Z12.31 VISIT FOR SCREENING MAMMOGRAM: ICD-10-CM

## 2022-01-21 PROCEDURE — 77067 SCR MAMMO BI INCL CAD: CPT

## 2022-09-02 ENCOUNTER — TRANSFERRED RECORDS (OUTPATIENT)
Dept: HEALTH INFORMATION MANAGEMENT | Facility: CLINIC | Age: 82
End: 2022-09-02

## 2022-09-02 ENCOUNTER — LAB REQUISITION (OUTPATIENT)
Dept: LAB | Facility: CLINIC | Age: 82
End: 2022-09-02

## 2022-09-02 DIAGNOSIS — I10 ESSENTIAL (PRIMARY) HYPERTENSION: ICD-10-CM

## 2022-09-02 DIAGNOSIS — E78.5 HYPERLIPIDEMIA, UNSPECIFIED: ICD-10-CM

## 2022-09-02 LAB
ALBUMIN SERPL BCG-MCNC: 4.2 G/DL (ref 3.5–5.2)
ALP SERPL-CCNC: 75 U/L (ref 35–104)
ALT SERPL W P-5'-P-CCNC: 22 U/L (ref 10–35)
ANION GAP SERPL CALCULATED.3IONS-SCNC: 12 MMOL/L (ref 7–15)
AST SERPL W P-5'-P-CCNC: 23 U/L (ref 10–35)
BILIRUB SERPL-MCNC: 0.7 MG/DL
BUN SERPL-MCNC: 18.1 MG/DL (ref 8–23)
CALCIUM SERPL-MCNC: 9.8 MG/DL (ref 8.8–10.2)
CHLORIDE SERPL-SCNC: 105 MMOL/L (ref 98–107)
CHOLEST SERPL-MCNC: 283 MG/DL
CREAT SERPL-MCNC: 1.33 MG/DL (ref 0.51–0.95)
DEPRECATED HCO3 PLAS-SCNC: 24 MMOL/L (ref 22–29)
GFR SERPL CREATININE-BSD FRML MDRD: 40 ML/MIN/1.73M2
GLUCOSE SERPL-MCNC: 113 MG/DL (ref 70–99)
HDLC SERPL-MCNC: 37 MG/DL
LDLC SERPL CALC-MCNC: 179 MG/DL
NONHDLC SERPL-MCNC: 246 MG/DL
POTASSIUM SERPL-SCNC: 4.2 MMOL/L (ref 3.4–5.3)
PROT SERPL-MCNC: 7 G/DL (ref 6.4–8.3)
SODIUM SERPL-SCNC: 141 MMOL/L (ref 136–145)
TRIGL SERPL-MCNC: 335 MG/DL

## 2022-09-02 PROCEDURE — 80061 LIPID PANEL: CPT | Performed by: STUDENT IN AN ORGANIZED HEALTH CARE EDUCATION/TRAINING PROGRAM

## 2022-09-02 PROCEDURE — 80053 COMPREHEN METABOLIC PANEL: CPT | Performed by: STUDENT IN AN ORGANIZED HEALTH CARE EDUCATION/TRAINING PROGRAM

## 2022-09-20 ENCOUNTER — TRANSFERRED RECORDS (OUTPATIENT)
Dept: HEALTH INFORMATION MANAGEMENT | Facility: CLINIC | Age: 82
End: 2022-09-20

## 2022-09-23 ENCOUNTER — OFFICE VISIT (OUTPATIENT)
Dept: CARDIOLOGY | Facility: CLINIC | Age: 82
End: 2022-09-23
Payer: COMMERCIAL

## 2022-09-23 VITALS
DIASTOLIC BLOOD PRESSURE: 62 MMHG | WEIGHT: 197 LBS | BODY MASS INDEX: 29.95 KG/M2 | SYSTOLIC BLOOD PRESSURE: 146 MMHG | HEART RATE: 75 BPM | RESPIRATION RATE: 16 BRPM

## 2022-09-23 DIAGNOSIS — I10 BENIGN ESSENTIAL HYPERTENSION: ICD-10-CM

## 2022-09-23 DIAGNOSIS — R06.09 DYSPNEA ON EXERTION: Primary | ICD-10-CM

## 2022-09-23 DIAGNOSIS — E78.5 HYPERLIPIDEMIA LDL GOAL <100: ICD-10-CM

## 2022-09-23 PROCEDURE — 99214 OFFICE O/P EST MOD 30 MIN: CPT | Performed by: INTERNAL MEDICINE

## 2022-09-23 RX ORDER — EVOLOCUMAB 140 MG/ML
140 INJECTION, SOLUTION SUBCUTANEOUS
Qty: 1 ML | Refills: 11 | Status: SHIPPED | OUTPATIENT
Start: 2022-09-23 | End: 2022-10-05

## 2022-09-23 RX ORDER — LOSARTAN POTASSIUM 100 MG/1
100 TABLET ORAL DAILY
Qty: 90 TABLET | Refills: 3 | Status: SHIPPED | OUTPATIENT
Start: 2022-09-23 | End: 2023-09-26

## 2022-09-23 NOTE — LETTER
9/23/2022    Yecenia Bundy MD  [facility] [address] [place] [address]    RE: Viki Robert       Dear Colleague,     I had the pleasure of seeing Viki Robert in the [facility].    HEART CARE ENCOUNTER CONSULTATON NOTE      [facility]  [phone]      Assessment/Recommendations   Assessment:    1.  Premature ventricular contractions:   Increased burden on recent holter after stopping her beta blocker.  She did not tolerate metoprolol . Will resume atenolol at a lower dose.  She had been on 100mg and will start 50 mg.  No bradcardia noted on holter.  Will repeat holter after start the atenolol.   2. Hypertension: well controlled  3. Dyslipidemia: severe hyperlipidemia with statin intolerance.   Recommend considering PCSK9 inhibitor  4. Coronary artery disease with CT cardiac calcium score of 23 2015  5. Obesity     Plan:  1.  Continue atenolol and losartan  2.  Sent in prescription for Repatha to start PCSK9 inhibitor  3.  CT chest wo contrast to further evaluate dyspnea.  Consider pulmonary referral.   Follow up in 6 months     History of Present Illness/Subjective    HPI: Viki Robert is a 82 year old female with history of PVCs, severe dyslipidemia with statin and zetia intolerance, mild coronary artery disease noted on previous CT calcium score and hypertension who I am seeing for a follow up visit.     Last year she had stopped her atenolol due to bradycardia.  Holter monitor on 3/15/21 showed very frequent PVCs 16% burden with ventricular bigeminy.   PVC morphology consistent with origin from the right ventricle.  Short runs of atrial tachycardia were also seen.   7/21/20 lipids show , HDL 38, .  She gained weight with zetia and did not tolerate statins.  I started her on low dose metoprolol and she suffered from nightmares.  Lexiscan nuclear stress test was negative for inducible ischemia. CT cardiac calcium score of 23 on  1/26/2015.      She is very deconditioned and does not exercise.  She complains of significant shortness of breath that has been progressive in nature.  This has been an ongoing problem.  BNP normal in the past.  Negative stress testing.  Echocardiogram was unremarkable for significant structural heart disease.         Physical Examination  Review of Systems   Vitals: BP (!) 146/62 (BP Location: Left arm, Patient Position: Sitting, Cuff Size: Adult Regular)   Pulse 75   Resp 16   Wt 89.4 kg (197 lb)   BMI 29.95 kg/m    BMI= Body mass index is 29.95 kg/m .  Wt Readings from Last 3 Encounters:   09/23/22 89.4 kg (197 lb)   04/15/21 93 kg (205 lb)   03/17/21 93.9 kg (207 lb)       General Appearance:   no distress, normal body habitus   ENT/Mouth: membranes moist, no oral lesions or bleeding gums.      EYES:  no scleral icterus, normal conjunctivae   Neck: no carotid bruits or thyromegaly   Chest/Lungs:   lungs are clear to auscultation   Cardiovascular:   Regular. Normal first and second heart sounds with no murmurs  no edema bilaterally    Abdomen:  no organomegaly, masses, bruits, or tenderness; bowel sounds are present   Extremities: no cyanosis or clubbing   Skin: no xanthelasma, warm.    Neurologic: normal  bilateral, no tremors     Psychiatric: alert and oriented x3, calm        Please refer above for cardiac ROS details.        Medical History  Surgical History Family History Social History    Past Surgical History:   Procedure Laterality Date     BIOPSY BREAST Left 2000     Family History   Problem Relation Age of Onset     Breast Cancer Mother 90.00     Multiple myeloma Father 75.00     Breast Cancer Paternal Grandmother 100.00        Social History     Socioeconomic History     Marital status: Single     Spouse name: Not on file     Number of children: Not on file     Years of education: Not on file     Highest education level: Not on file   Occupational History     Not on file   Tobacco Use      Smoking status: Never Smoker     Smokeless tobacco: Never Used   Substance and Sexual Activity     Alcohol use: Not on file     Drug use: Never     Sexual activity: Not on file   Other Topics Concern     Not on file   Social History Narrative     Not on file     Social Determinants of Health     Financial Resource Strain: Not on file   Food Insecurity: Not on file   Transportation Needs: Not on file   Physical Activity: Not on file   Stress: Not on file   Social Connections: Not on file   Intimate Partner Violence: Not on file   Housing Stability: Not on file           Medications  Allergies   Current Outpatient Medications   Medication Sig Dispense Refill     aspirin 81 MG EC tablet [ASPIRIN 81 MG EC TABLET] Take 81 mg by mouth daily.       atenoloL (TENORMIN) 50 MG tablet [ATENOLOL (TENORMIN) 50 MG TABLET] Take 1 tablet (50 mg total) by mouth daily. 90 tablet 3     cholecalciferol, vitamin D3, 1,000 unit (25 mcg) tablet [CHOLECALCIFEROL, VITAMIN D3, 1,000 UNIT (25 MCG) TABLET] Take 1,000 Units by mouth daily.       evolocumab (REPATHA SURECLICK) 140 MG/ML prefilled autoinjector Inject 1 mL (140 mg) Subcutaneous every 14 days 1 mL 11     lansoprazole (PREVACID) 15 MG capsule [LANSOPRAZOLE (PREVACID) 15 MG CAPSULE] Take 15 mg by mouth daily.       losartan (COZAAR) 100 MG tablet Take 1 tablet (100 mg) by mouth daily 90 tablet 3     multivitamin (ONE A DAY) per tablet [MULTIVITAMIN (ONE A DAY) PER TABLET] Take 1 tablet by mouth daily.         Allergies   Allergen Reactions     Atorvastatin Unknown     Achy & weak     Hydrochlorothiazide Unknown     Light headed     Lodine [Etodolac] Unknown     Naprosyn [Naproxen] Unknown     Ankle swelling     Pravachol [Pravastatin] Unknown     Muscle aches & weakness          Lab Results    Chemistry/lipid CBC Cardiac Enzymes/BNP/TSH/INR   Recent Labs   Lab Test 09/02/22  1145   CHOL 283*   HDL 37*   *   TRIG 335*     Recent Labs   Lab Test 09/02/22  1145 09/03/21  1547  07/21/20  1404   * 187* 213*     Recent Labs   Lab Test 09/02/22  1145      POTASSIUM 4.2   CHLORIDE 105   CO2 24   *   BUN 18.1   CR 1.33*   GFRESTIMATED 40*   DORIE 9.8     Recent Labs   Lab Test 09/02/22  1145 11/04/21  1155 09/03/21  1547   CR 1.33* 1.49* 1.41*     Recent Labs   Lab Test 05/09/19  1025   A1C 6.1          Recent Labs   Lab Test 05/09/19  1025   WBC 9.6   HGB 14.1   HCT 42.2   MCV 93        Recent Labs   Lab Test 05/09/19  1025   HGB 14.1    No results for input(s): TROPONINI in the last 72555 hours.  Recent Labs   Lab Test 03/06/20  1550   BNP 82     Recent Labs   Lab Test 03/06/20  1550   TSH 1.55     No results for input(s): INR in the last 72440 hours.     Theresa Ortiz MD    Thank you for allowing me to participate in the care of your patient.    Sincerely,   Theresa Ortiz MD   [facility]

## 2022-09-26 ENCOUNTER — TELEPHONE (OUTPATIENT)
Dept: CARDIOLOGY | Facility: CLINIC | Age: 82
End: 2022-09-26

## 2022-09-26 NOTE — TELEPHONE ENCOUNTER
Response noted - confirmed new Rx sent to [facility] 9-23-22  - await 10-4-22 echo, chest CT pending scheduling - no follow-up sched or pending.  mg

## 2022-09-26 NOTE — TELEPHONE ENCOUNTER
----- Message from Theresa Ortiz MD sent at 9/23/2022  2:46 PM CDT -----  She is now interested in repatha.  I'll send in the script

## 2022-09-27 NOTE — PROGRESS NOTES
HEART CARE ENCOUNTER CONSULTATON NOTE      [facility]  [phone]      Assessment/Recommendations   Assessment:    1.  Premature ventricular contractions:   Increased burden on recent holter after stopping her beta blocker.  She did not tolerate metoprolol . Will resume atenolol at a lower dose.  She had been on 100mg and will start 50 mg.  No bradcardia noted on holter.  Will repeat holter after start the atenolol.   2. Hypertension: well controlled  3. Dyslipidemia: severe hyperlipidemia with statin intolerance.   Recommend considering PCSK9 inhibitor  4. Coronary artery disease with CT cardiac calcium score of 23 2015  5. Obesity     Plan:  1.  Continue atenolol and losartan  2.  Sent in prescription for Repatha to start PCSK9 inhibitor  3.  CT chest wo contrast to further evaluate dyspnea.  Consider pulmonary referral.   Follow up in 6 months     History of Present Illness/Subjective    HPI: Viki Robert is a 82 year old female with history of PVCs, severe dyslipidemia with statin and zetia intolerance, mild coronary artery disease noted on previous CT calcium score and hypertension who I am seeing for a follow up visit.     Last year she had stopped her atenolol due to bradycardia.  Holter monitor on 3/15/21 showed very frequent PVCs 16% burden with ventricular bigeminy.   PVC morphology consistent with origin from the right ventricle.  Short runs of atrial tachycardia were also seen.   7/21/20 lipids show , HDL 38, .  She gained weight with zetia and did not tolerate statins.  I started her on low dose metoprolol and she suffered from nightmares.  Lexiscan nuclear stress test was negative for inducible ischemia. CT cardiac calcium score of 23 on 1/26/2015.      She is very deconditioned and does not exercise.  She complains of significant shortness of breath that has been progressive in nature.  This has been an ongoing problem.  BNP normal in the past.  Negative stress  testing.  Echocardiogram was unremarkable for significant structural heart disease.         Physical Examination  Review of Systems   Vitals: BP (!) 146/62 (BP Location: Left arm, Patient Position: Sitting, Cuff Size: Adult Regular)   Pulse 75   Resp 16   Wt 89.4 kg (197 lb)   BMI 29.95 kg/m    BMI= Body mass index is 29.95 kg/m .  Wt Readings from Last 3 Encounters:   09/23/22 89.4 kg (197 lb)   04/15/21 93 kg (205 lb)   03/17/21 93.9 kg (207 lb)       General Appearance:   no distress, normal body habitus   ENT/Mouth: membranes moist, no oral lesions or bleeding gums.      EYES:  no scleral icterus, normal conjunctivae   Neck: no carotid bruits or thyromegaly   Chest/Lungs:   lungs are clear to auscultation   Cardiovascular:   Regular. Normal first and second heart sounds with no murmurs  no edema bilaterally    Abdomen:  no organomegaly, masses, bruits, or tenderness; bowel sounds are present   Extremities: no cyanosis or clubbing   Skin: no xanthelasma, warm.    Neurologic: normal  bilateral, no tremors     Psychiatric: alert and oriented x3, calm        Please refer above for cardiac ROS details.        Medical History  Surgical History Family History Social History    Past Surgical History:   Procedure Laterality Date     BIOPSY BREAST Left 2000     Family History   Problem Relation Age of Onset     Breast Cancer Mother 90.00     Multiple myeloma Father 75.00     Breast Cancer Paternal Grandmother 100.00        Social History     Socioeconomic History     Marital status: Single     Spouse name: Not on file     Number of children: Not on file     Years of education: Not on file     Highest education level: Not on file   Occupational History     Not on file   Tobacco Use     Smoking status: Never Smoker     Smokeless tobacco: Never Used   Substance and Sexual Activity     Alcohol use: Not on file     Drug use: Never     Sexual activity: Not on file   Other Topics Concern     Not on file   Social History  Narrative     Not on file     Social Determinants of Health     Financial Resource Strain: Not on file   Food Insecurity: Not on file   Transportation Needs: Not on file   Physical Activity: Not on file   Stress: Not on file   Social Connections: Not on file   Intimate Partner Violence: Not on file   Housing Stability: Not on file           Medications  Allergies   Current Outpatient Medications   Medication Sig Dispense Refill     aspirin 81 MG EC tablet [ASPIRIN 81 MG EC TABLET] Take 81 mg by mouth daily.       atenoloL (TENORMIN) 50 MG tablet [ATENOLOL (TENORMIN) 50 MG TABLET] Take 1 tablet (50 mg total) by mouth daily. 90 tablet 3     cholecalciferol, vitamin D3, 1,000 unit (25 mcg) tablet [CHOLECALCIFEROL, VITAMIN D3, 1,000 UNIT (25 MCG) TABLET] Take 1,000 Units by mouth daily.       evolocumab (REPATHA SURECLICK) 140 MG/ML prefilled autoinjector Inject 1 mL (140 mg) Subcutaneous every 14 days 1 mL 11     lansoprazole (PREVACID) 15 MG capsule [LANSOPRAZOLE (PREVACID) 15 MG CAPSULE] Take 15 mg by mouth daily.       losartan (COZAAR) 100 MG tablet Take 1 tablet (100 mg) by mouth daily 90 tablet 3     multivitamin (ONE A DAY) per tablet [MULTIVITAMIN (ONE A DAY) PER TABLET] Take 1 tablet by mouth daily.         Allergies   Allergen Reactions     Atorvastatin Unknown     Achy & weak     Hydrochlorothiazide Unknown     Light headed     Lodine [Etodolac] Unknown     Naprosyn [Naproxen] Unknown     Ankle swelling     Pravachol [Pravastatin] Unknown     Muscle aches & weakness          Lab Results    Chemistry/lipid CBC Cardiac Enzymes/BNP/TSH/INR   Recent Labs   Lab Test 09/02/22  1145   CHOL 283*   HDL 37*   *   TRIG 335*     Recent Labs   Lab Test 09/02/22  1145 09/03/21  1547 07/21/20  1404   * 187* 213*     Recent Labs   Lab Test 09/02/22  1145      POTASSIUM 4.2   CHLORIDE 105   CO2 24   *   BUN 18.1   CR 1.33*   GFRESTIMATED 40*   DORIE 9.8     Recent Labs   Lab Test 09/02/22  1145  11/04/21  1155 09/03/21  1547   CR 1.33* 1.49* 1.41*     Recent Labs   Lab Test 05/09/19  1025   A1C 6.1          Recent Labs   Lab Test 05/09/19  1025   WBC 9.6   HGB 14.1   HCT 42.2   MCV 93        Recent Labs   Lab Test 05/09/19  1025   HGB 14.1    No results for input(s): TROPONINI in the last 63928 hours.  Recent Labs   Lab Test 03/06/20  1550   BNP 82     Recent Labs   Lab Test 03/06/20  1550   TSH 1.55     No results for input(s): INR in the last 09723 hours.     Theresa Ortiz MD

## 2022-10-02 ENCOUNTER — HOSPITAL ENCOUNTER (OUTPATIENT)
Dept: CT IMAGING | Facility: CLINIC | Age: 82
Discharge: HOME | End: 2022-10-02
Attending: INTERNAL MEDICINE | Admitting: INTERNAL MEDICINE
Payer: COMMERCIAL

## 2022-10-02 DIAGNOSIS — R06.09 DYSPNEA ON EXERTION: ICD-10-CM

## 2022-10-02 PROCEDURE — 71250 CT THORAX DX C-: CPT

## 2022-10-04 ENCOUNTER — HOSPITAL ENCOUNTER (OUTPATIENT)
Dept: CARDIOLOGY | Facility: CLINIC | Age: 82
Discharge: HOME | End: 2022-10-04
Attending: INTERNAL MEDICINE | Admitting: INTERNAL MEDICINE
Payer: COMMERCIAL

## 2022-10-04 ENCOUNTER — TELEPHONE (OUTPATIENT)
Dept: CARDIOLOGY | Facility: CLINIC | Age: 82
End: 2022-10-04

## 2022-10-04 DIAGNOSIS — R06.09 DYSPNEA ON EXERTION: ICD-10-CM

## 2022-10-04 LAB — LVEF ECHO: NORMAL

## 2022-10-04 PROCEDURE — 999N000208 ECHOCARDIOGRAM COMPLETE

## 2022-10-04 PROCEDURE — 255N000002 HC RX 255 OP 636: Performed by: INTERNAL MEDICINE

## 2022-10-04 PROCEDURE — 93306 TTE W/DOPPLER COMPLETE: CPT | Mod: 26 | Performed by: INTERNAL MEDICINE

## 2022-10-04 RX ADMIN — PERFLUTREN 2 ML: 6.52 INJECTION, SUSPENSION INTRAVENOUS at 13:42

## 2022-10-04 NOTE — TELEPHONE ENCOUNTER
----- Message from Lesley Corley V sent at 10/4/2022  2:09 PM CDT -----  Viki Narvaez stopped by the clinic after her echo today and just wanted to let Dr. Ortiz know that her insurance did decline her repatha prescription, but with how good her tests have been coming up she does not want to follow through with the repatha anyway.    Thanks!

## 2022-10-04 NOTE — TELEPHONE ENCOUNTER
Left msg for patient requesting call back with update after noting no PA was requested and/or  Completed for Repatha.  mg

## 2022-10-05 NOTE — TELEPHONE ENCOUNTER
"Rec'd return call from patient who reported that she had contacted [facility] to address Losartan Rx and was informed that [facility] had cx'd Repatha Rx for reasons unknown.    Patient explained that she is ok with [facility]'s decision because she had read up on Repatha and has since decided she does not want to take medication.    Patient also stated that she was able to view 10-4-22 echo results on [facility] \"and they looked ok\" - reassured patient that update would be forwarded to Dr. Ortiz and she would be sent Dr. Ortiz's comments r.e. echo results through [facility] once available - patient verbalized understanding and agreed with plan.  mg  "

## 2022-10-07 NOTE — TELEPHONE ENCOUNTER
Msg rec'd 10-6-22 @ 1750:  Theresa Ortiz MD Gorshe, Maureen, RN  Thank you for the update on repatha.  Echo results reviewed and no new recommendations.

## 2022-10-10 DIAGNOSIS — R06.09 DYSPNEA ON EXERTION: Primary | ICD-10-CM

## 2022-10-10 DIAGNOSIS — I10 BENIGN ESSENTIAL HYPERTENSION: ICD-10-CM

## 2022-10-11 ENCOUNTER — LAB (OUTPATIENT)
Dept: CARDIOLOGY | Facility: CLINIC | Age: 82
End: 2022-10-11
Payer: COMMERCIAL

## 2022-10-11 DIAGNOSIS — I10 BENIGN ESSENTIAL HYPERTENSION: ICD-10-CM

## 2022-10-11 DIAGNOSIS — R06.09 DYSPNEA ON EXERTION: ICD-10-CM

## 2022-10-11 PROCEDURE — 36415 COLL VENOUS BLD VENIPUNCTURE: CPT

## 2022-10-11 PROCEDURE — 83880 ASSAY OF NATRIURETIC PEPTIDE: CPT

## 2022-10-12 LAB — NT-PROBNP SERPL-MCNC: 271 PG/ML (ref 0–1800)

## 2022-11-30 ENCOUNTER — TRANSFERRED RECORDS (OUTPATIENT)
Dept: HEALTH INFORMATION MANAGEMENT | Facility: CLINIC | Age: 82
End: 2022-11-30

## 2022-11-30 ENCOUNTER — LAB REQUISITION (OUTPATIENT)
Dept: LAB | Facility: CLINIC | Age: 82
End: 2022-11-30

## 2022-11-30 DIAGNOSIS — E55.9 VITAMIN D DEFICIENCY, UNSPECIFIED: ICD-10-CM

## 2022-11-30 PROCEDURE — 82306 VITAMIN D 25 HYDROXY: CPT | Performed by: STUDENT IN AN ORGANIZED HEALTH CARE EDUCATION/TRAINING PROGRAM

## 2022-12-01 LAB — DEPRECATED CALCIDIOL+CALCIFEROL SERPL-MC: <4 UG/L (ref 20–75)

## 2023-02-05 ENCOUNTER — HEALTH MAINTENANCE LETTER (OUTPATIENT)
Age: 83
End: 2023-02-05

## 2023-03-01 ENCOUNTER — LAB REQUISITION (OUTPATIENT)
Dept: LAB | Facility: CLINIC | Age: 83
End: 2023-03-01

## 2023-03-01 DIAGNOSIS — E55.9 VITAMIN D DEFICIENCY, UNSPECIFIED: ICD-10-CM

## 2023-03-01 PROCEDURE — 82306 VITAMIN D 25 HYDROXY: CPT | Performed by: STUDENT IN AN ORGANIZED HEALTH CARE EDUCATION/TRAINING PROGRAM

## 2023-03-04 LAB — DEPRECATED CALCIDIOL+CALCIFEROL SERPL-MC: 121 UG/L (ref 20–75)

## 2023-04-11 ENCOUNTER — OFFICE VISIT (OUTPATIENT)
Dept: CARDIOLOGY | Facility: CLINIC | Age: 83
End: 2023-04-11
Attending: INTERNAL MEDICINE
Payer: COMMERCIAL

## 2023-04-11 VITALS
BODY MASS INDEX: 29.7 KG/M2 | SYSTOLIC BLOOD PRESSURE: 146 MMHG | DIASTOLIC BLOOD PRESSURE: 75 MMHG | HEART RATE: 85 BPM | HEIGHT: 68 IN | RESPIRATION RATE: 14 BRPM | WEIGHT: 196 LBS

## 2023-04-11 DIAGNOSIS — E78.5 HYPERLIPIDEMIA LDL GOAL <100: ICD-10-CM

## 2023-04-11 DIAGNOSIS — R06.09 DYSPNEA ON EXERTION: ICD-10-CM

## 2023-04-11 PROCEDURE — 99214 OFFICE O/P EST MOD 30 MIN: CPT | Performed by: INTERNAL MEDICINE

## 2023-04-11 NOTE — PROGRESS NOTES
HEART CARE ENCOUNTER CONSULTATON NOTE      [facility]  [phone]      Assessment/Recommendations   Assessment:  1.  Dyspnea on exertion: Likely multifactorial.  Very deconditioned with possibly untreated sleep apnea  Evidence of abnormal diastolic function on her echocardiogram.  I discussed considering a coronary angiogram with left and right heart catheterization.  She does not want to study as she is concerned about reaction to the dye.  Her father needed dialysis after he had a dye study in the past.  I also discussed considering a diuretic and she declines at this time.  We will proceed with sleep study evaluation.  2.  PVCs: Symptomatic and will proceed with 24-hour Holter  3.  Coronary artery disease with normal stress testing in the past  4.  Hypertension: Mildly elevated today  5.  Hyperlipidemia untreated due to multiple medication intolerances    Plan:  1.  Sleep study evaluation  2.  24-hour Holter monitor       History of Present Illness/Subjective    HPI: Viki Robert is a 82 year old female with history of PVCs, severe dyslipidemia with statin and zetia intolerance, mild coronary artery disease noted on previous CT calcium score and hypertension who I am seeing for a follow up visit.  We discussed starting Repatha however she read about the side effects and decided against doing it.  In the past she had stopped atenolol.  Follow-up Holter showed 16% burden of PVCs that were symptomatic and short runs of atrial tachycardia.  She had nightmares with metoprolol and atenolol was resumed.  Holter monitor from last year showed 5% burden of PVCs.  She has known hyperlipidemia with elevated LDL running usually around 170-200.  She is not very deconditioned and does not exercise.  No complaints of chest pain or edema but has noted consistently problems with shortness of breath and today notes that it has become progressively worse.  She also feels that her PVCs are more symptomatic  "and occurring more frequently.  Echocardiogram did not show significant valvular disease and normal LVEF however did show abnormal diastolic dysfunction.    Intolerance to statins and Zetia.  Did not start Repatha as she is concerned about side effects.  Intolerance to metoprolol with nightmares.         Physical Examination  Review of Systems   Vitals: BP (!) 146/75 (BP Location: Left arm, Patient Position: Sitting, Cuff Size: Adult Large)   Pulse 85   Resp 14   Ht 1.727 m (5' 8\")   Wt 88.9 kg (196 lb)   BMI 29.80 kg/m    BMI= Body mass index is 29.8 kg/m .  Wt Readings from Last 3 Encounters:   04/11/23 88.9 kg (196 lb)   09/23/22 89.4 kg (197 lb)   04/15/21 93 kg (205 lb)       General Appearance:   no distress, normal body habitus   ENT/Mouth: membranes moist, no oral lesions or bleeding gums.      EYES:  no scleral icterus, normal conjunctivae   Neck: no carotid bruits or thyromegaly   Chest/Lungs:   lungs are clear to auscultation   Cardiovascular:   Regular. Normal first and second heart sounds with no murmurs trace edema bilaterally    Abdomen:   bowel sounds are present   Extremities: no cyanosis or clubbing   Skin: no xanthelasma, warm.    Neurologic: normal  bilateral, no tremors     Psychiatric: alert and oriented x3, calm        Please refer above for cardiac ROS details.        Medical History  Surgical History Family History Social History   Obesity  Symptomatic PVCs  Hyperlipidemia  Coronary artery disease  Hypertension Past Surgical History:   Procedure Laterality Date     BIOPSY BREAST Left 2000     Family History   Problem Relation Age of Onset     Breast Cancer Mother 90.00     Multiple myeloma Father 75.00     Breast Cancer Paternal Grandmother 100.00        Social History     Socioeconomic History     Marital status: Single     Spouse name: Not on file     Number of children: Not on file     Years of education: Not on file     Highest education level: Not on file   Occupational History "     Not on file   Tobacco Use     Smoking status: Never     Smokeless tobacco: Never   Vaping Use     Vaping status: Not on file   Substance and Sexual Activity     Alcohol use: Not on file     Drug use: Never     Sexual activity: Not on file   Other Topics Concern     Not on file   Social History Narrative     Not on file     Social Determinants of Health     Financial Resource Strain: Not on file   Food Insecurity: Not on file   Transportation Needs: Not on file   Physical Activity: Not on file   Stress: Not on file   Social Connections: Not on file   Intimate Partner Violence: Not on file   Housing Stability: Not on file           Medications  Allergies   Current Outpatient Medications   Medication Sig Dispense Refill     aspirin 81 MG EC tablet [ASPIRIN 81 MG EC TABLET] Take 81 mg by mouth daily.       atenoloL (TENORMIN) 50 MG tablet [ATENOLOL (TENORMIN) 50 MG TABLET] Take 1 tablet (50 mg total) by mouth daily. 90 tablet 3     lansoprazole (PREVACID) 15 MG capsule [LANSOPRAZOLE (PREVACID) 15 MG CAPSULE] Take 15 mg by mouth daily.       losartan (COZAAR) 100 MG tablet Take 1 tablet (100 mg) by mouth daily 90 tablet 3     cholecalciferol, vitamin D3, 1,000 unit (25 mcg) tablet [CHOLECALCIFEROL, VITAMIN D3, 1,000 UNIT (25 MCG) TABLET] Take 1,000 Units by mouth daily. (Patient not taking: Reported on 4/11/2023)       multivitamin (ONE A DAY) per tablet [MULTIVITAMIN (ONE A DAY) PER TABLET] Take 1 tablet by mouth daily. (Patient not taking: Reported on 4/11/2023)         Allergies   Allergen Reactions     Atorvastatin Unknown     Achy & weak     Hydrochlorothiazide Unknown     Light headed     Lodine [Etodolac] Unknown     Naprosyn [Naproxen] Unknown     Ankle swelling     Pravachol [Pravastatin] Unknown     Muscle aches & weakness     Statins [Hmg-Coa-R Inhibitors] Muscle Pain (Myalgia)          Lab Results    Chemistry/lipid CBC Cardiac Enzymes/BNP/TSH/INR   Recent Labs   Lab Test 09/02/22  1145   CHOL 283*   HDL  37*   *   TRIG 335*     Recent Labs   Lab Test 09/02/22  1145 09/03/21  1547 07/21/20  1404   * 187* 213*     Recent Labs   Lab Test 09/02/22  1145      POTASSIUM 4.2   CHLORIDE 105   CO2 24   *   BUN 18.1   CR 1.33*   GFRESTIMATED 40*   DORIE 9.8     Recent Labs   Lab Test 09/02/22  1145 11/04/21  1155 09/03/21  1547   CR 1.33* 1.49* 1.41*     Recent Labs   Lab Test 05/09/19  1025   A1C 6.1          Recent Labs   Lab Test 05/09/19  1025   WBC 9.6   HGB 14.1   HCT 42.2   MCV 93        Recent Labs   Lab Test 05/09/19  1025   HGB 14.1    No results for input(s): TROPONINI in the last 26786 hours.  Recent Labs   Lab Test 10/11/22  1508 03/06/20  1550   BNP  --  82   NTBNP 271  --      Recent Labs   Lab Test 03/06/20  1550   TSH 1.55     No results for input(s): INR in the last 42859 hours.     Theresa Ortiz MD

## 2023-04-11 NOTE — LETTER
4/11/2023    Yecenia Bundy MD  [address] [place] [address]    RE: Viki Roebrt       Dear Colleague,     I had the pleasure of seeing Viki Robert in the [facility].    HEART CARE ENCOUNTER CONSULTATON NOTE      [facility]  [phone]      Assessment/Recommendations   Assessment:  1.  Dyspnea on exertion: Likely multifactorial.  Very deconditioned with possibly untreated sleep apnea  Evidence of abnormal diastolic function on her echocardiogram.  I discussed considering a coronary angiogram with left and right heart catheterization.  She does not want to study as she is concerned about reaction to the dye.  Her father needed dialysis after he had a dye study in the past.  I also discussed considering a diuretic and she declines at this time.  We will proceed with sleep study evaluation.  2.  PVCs: Symptomatic and will proceed with 24-hour Holter  3.  Coronary artery disease with normal stress testing in the past  4.  Hypertension: Mildly elevated today  5.  Hyperlipidemia untreated due to multiple medication intolerances    Plan:  1.  Sleep study evaluation  2.  24-hour Holter monitor       History of Present Illness/Subjective    HPI: Viki Robert is a 82 year old female with history of PVCs, severe dyslipidemia with statin and zetia intolerance, mild coronary artery disease noted on previous CT calcium score and hypertension who I am seeing for a follow up visit.  We discussed starting Repatha however she read about the side effects and decided against doing it.  In the past she had stopped atenolol.  Follow-up Holter showed 16% burden of PVCs that were symptomatic and short runs of atrial tachycardia.  She had nightmares with metoprolol and atenolol was resumed.  Holter monitor from last year showed 5% burden of PVCs.  She has known hyperlipidemia with elevated LDL running usually around 170-200.  She is not very deconditioned and does not exercise.  No  "complaints of chest pain or edema but has noted consistently problems with shortness of breath and today notes that it has become progressively worse.  She also feels that her PVCs are more symptomatic and occurring more frequently.  Echocardiogram did not show significant valvular disease and normal LVEF however did show abnormal diastolic dysfunction.    Intolerance to statins and Zetia.  Did not start Repatha as she is concerned about side effects.  Intolerance to metoprolol with nightmares.         Physical Examination  Review of Systems   Vitals: BP (!) 146/75 (BP Location: Left arm, Patient Position: Sitting, Cuff Size: Adult Large)   Pulse 85   Resp 14   Ht 1.727 m (5' 8\")   Wt 88.9 kg (196 lb)   BMI 29.80 kg/m    BMI= Body mass index is 29.8 kg/m .  Wt Readings from Last 3 Encounters:   04/11/23 88.9 kg (196 lb)   09/23/22 89.4 kg (197 lb)   04/15/21 93 kg (205 lb)       General Appearance:   no distress, normal body habitus   ENT/Mouth: membranes moist, no oral lesions or bleeding gums.      EYES:  no scleral icterus, normal conjunctivae   Neck: no carotid bruits or thyromegaly   Chest/Lungs:   lungs are clear to auscultation   Cardiovascular:   Regular. Normal first and second heart sounds with no murmurs trace edema bilaterally    Abdomen:   bowel sounds are present   Extremities: no cyanosis or clubbing   Skin: no xanthelasma, warm.    Neurologic: normal  bilateral, no tremors     Psychiatric: alert and oriented x3, calm        Please refer above for cardiac ROS details.        Medical History  Surgical History Family History Social History   Obesity  Symptomatic PVCs  Hyperlipidemia  Coronary artery disease  Hypertension Past Surgical History:   Procedure Laterality Date    BIOPSY BREAST Left 2000     Family History   Problem Relation Age of Onset    Breast Cancer Mother 90.00    Multiple myeloma Father 75.00    Breast Cancer Paternal Grandmother 100.00        Social History     Socioeconomic " History    Marital status: Single     Spouse name: Not on file    Number of children: Not on file    Years of education: Not on file    Highest education level: Not on file   Occupational History    Not on file   Tobacco Use    Smoking status: Never    Smokeless tobacco: Never   Vaping Use    Vaping status: Not on file   Substance and Sexual Activity    Alcohol use: Not on file    Drug use: Never    Sexual activity: Not on file   Other Topics Concern    Not on file   Social History Narrative    Not on file     Social Determinants of Health     Financial Resource Strain: Not on file   Food Insecurity: Not on file   Transportation Needs: Not on file   Physical Activity: Not on file   Stress: Not on file   Social Connections: Not on file   Intimate Partner Violence: Not on file   Housing Stability: Not on file           Medications  Allergies   Current Outpatient Medications   Medication Sig Dispense Refill    aspirin 81 MG EC tablet [ASPIRIN 81 MG EC TABLET] Take 81 mg by mouth daily.      atenoloL (TENORMIN) 50 MG tablet [ATENOLOL (TENORMIN) 50 MG TABLET] Take 1 tablet (50 mg total) by mouth daily. 90 tablet 3    lansoprazole (PREVACID) 15 MG capsule [LANSOPRAZOLE (PREVACID) 15 MG CAPSULE] Take 15 mg by mouth daily.      losartan (COZAAR) 100 MG tablet Take 1 tablet (100 mg) by mouth daily 90 tablet 3    cholecalciferol, vitamin D3, 1,000 unit (25 mcg) tablet [CHOLECALCIFEROL, VITAMIN D3, 1,000 UNIT (25 MCG) TABLET] Take 1,000 Units by mouth daily. (Patient not taking: Reported on 4/11/2023)      multivitamin (ONE A DAY) per tablet [MULTIVITAMIN (ONE A DAY) PER TABLET] Take 1 tablet by mouth daily. (Patient not taking: Reported on 4/11/2023)         Allergies   Allergen Reactions    Atorvastatin Unknown     Achy & weak    Hydrochlorothiazide Unknown     Light headed    Lodine [Etodolac] Unknown    Naprosyn [Naproxen] Unknown     Ankle swelling    Pravachol [Pravastatin] Unknown     Muscle aches & weakness    Statins  [Hmg-Coa-R Inhibitors] Muscle Pain (Myalgia)          Lab Results    Chemistry/lipid CBC Cardiac Enzymes/BNP/TSH/INR   Recent Labs   Lab Test 09/02/22  1145   CHOL 283*   HDL 37*   *   TRIG 335*     Recent Labs   Lab Test 09/02/22  1145 09/03/21  1547 07/21/20  1404   * 187* 213*     Recent Labs   Lab Test 09/02/22  1145      POTASSIUM 4.2   CHLORIDE 105   CO2 24   *   BUN 18.1   CR 1.33*   GFRESTIMATED 40*   DORIE 9.8     Recent Labs   Lab Test 09/02/22  1145 11/04/21  1155 09/03/21  1547   CR 1.33* 1.49* 1.41*     Recent Labs   Lab Test 05/09/19  1025   A1C 6.1          Recent Labs   Lab Test 05/09/19  1025   WBC 9.6   HGB 14.1   HCT 42.2   MCV 93        Recent Labs   Lab Test 05/09/19  1025   HGB 14.1    No results for input(s): TROPONINI in the last 45470 hours.  Recent Labs   Lab Test 10/11/22  1508 03/06/20  1550   BNP  --  82   NTBNP 271  --      Recent Labs   Lab Test 03/06/20  1550   TSH 1.55     No results for input(s): INR in the last 49358 hours.     Theresa Ortiz MD        Thank you for allowing me to participate in the care of your patient.      Sincerely,     Theresa Ortiz MD     [facility]  cc:   Theresa Ortiz MD  [address]  [address]  [place] [address]

## 2023-04-14 ENCOUNTER — HOSPITAL ENCOUNTER (OUTPATIENT)
Dept: CARDIOLOGY | Facility: CLINIC | Age: 83
Discharge: HOME | End: 2023-04-14
Attending: INTERNAL MEDICINE | Admitting: INTERNAL MEDICINE
Payer: COMMERCIAL

## 2023-04-14 DIAGNOSIS — R06.09 DYSPNEA ON EXERTION: ICD-10-CM

## 2023-04-14 DIAGNOSIS — E78.5 HYPERLIPIDEMIA LDL GOAL <100: ICD-10-CM

## 2023-04-14 PROCEDURE — 93226 XTRNL ECG REC<48 HR SCAN A/R: CPT

## 2023-04-19 PROCEDURE — 93227 XTRNL ECG REC<48 HR R&I: CPT | Performed by: INTERNAL MEDICINE

## 2023-04-20 DIAGNOSIS — I49.3 PVC'S (PREMATURE VENTRICULAR CONTRACTIONS): ICD-10-CM

## 2023-04-20 RX ORDER — ATENOLOL 25 MG/1
TABLET ORAL
Qty: 270 TABLET | Refills: 1 | Status: SHIPPED | OUTPATIENT
Start: 2023-04-20 | End: 2023-09-29

## 2023-04-27 DIAGNOSIS — M79.89 SWELLING OF LIMB: Primary | ICD-10-CM

## 2023-04-27 RX ORDER — FUROSEMIDE 20 MG
20 TABLET ORAL DAILY PRN
Qty: 30 TABLET | Refills: 3 | Status: SHIPPED | OUTPATIENT
Start: 2023-04-27 | End: 2024-03-27

## 2023-04-28 DIAGNOSIS — R06.09 DYSPNEA ON EXERTION: ICD-10-CM

## 2023-04-28 DIAGNOSIS — M79.89 SWELLING OF LIMB: Primary | ICD-10-CM

## 2023-05-09 ENCOUNTER — LAB REQUISITION (OUTPATIENT)
Dept: LAB | Facility: CLINIC | Age: 83
End: 2023-05-09

## 2023-05-09 DIAGNOSIS — E55.9 VITAMIN D DEFICIENCY, UNSPECIFIED: ICD-10-CM

## 2023-05-09 PROCEDURE — 82306 VITAMIN D 25 HYDROXY: CPT | Performed by: STUDENT IN AN ORGANIZED HEALTH CARE EDUCATION/TRAINING PROGRAM

## 2023-05-10 LAB — DEPRECATED CALCIDIOL+CALCIFEROL SERPL-MC: 69 UG/L (ref 20–75)

## 2023-06-22 ENCOUNTER — LAB REQUISITION (OUTPATIENT)
Dept: LAB | Facility: CLINIC | Age: 83
End: 2023-06-22

## 2023-06-22 DIAGNOSIS — R39.15 URGENCY OF URINATION: ICD-10-CM

## 2023-06-22 PROCEDURE — 87086 URINE CULTURE/COLONY COUNT: CPT | Performed by: FAMILY MEDICINE

## 2023-06-24 LAB — BACTERIA UR CULT: NORMAL

## 2023-08-04 ASSESSMENT — SLEEP AND FATIGUE QUESTIONNAIRES
HOW LIKELY ARE YOU TO NOD OFF OR FALL ASLEEP WHILE SITTING INACTIVE IN A PUBLIC PLACE: WOULD NEVER DOZE
HOW LIKELY ARE YOU TO NOD OFF OR FALL ASLEEP WHILE SITTING AND TALKING TO SOMEONE: WOULD NEVER DOZE
HOW LIKELY ARE YOU TO NOD OFF OR FALL ASLEEP WHILE SITTING QUIETLY AFTER LUNCH WITHOUT ALCOHOL: WOULD NEVER DOZE
HOW LIKELY ARE YOU TO NOD OFF OR FALL ASLEEP WHILE WATCHING TV: MODERATE CHANCE OF DOZING
HOW LIKELY ARE YOU TO NOD OFF OR FALL ASLEEP IN A CAR, WHILE STOPPED FOR A FEW MINUTES IN TRAFFIC: WOULD NEVER DOZE
HOW LIKELY ARE YOU TO NOD OFF OR FALL ASLEEP WHEN YOU ARE A PASSENGER IN A CAR FOR AN HOUR WITHOUT A BREAK: WOULD NEVER DOZE
HOW LIKELY ARE YOU TO NOD OFF OR FALL ASLEEP WHILE LYING DOWN TO REST IN THE AFTERNOON WHEN CIRCUMSTANCES PERMIT: SLIGHT CHANCE OF DOZING
HOW LIKELY ARE YOU TO NOD OFF OR FALL ASLEEP WHILE SITTING AND READING: MODERATE CHANCE OF DOZING

## 2023-08-10 ENCOUNTER — LAB REQUISITION (OUTPATIENT)
Dept: LAB | Facility: CLINIC | Age: 83
End: 2023-08-10

## 2023-08-10 DIAGNOSIS — R60.0 LOCALIZED EDEMA: ICD-10-CM

## 2023-08-10 DIAGNOSIS — M79.671 PAIN IN RIGHT FOOT: ICD-10-CM

## 2023-08-10 LAB
ANION GAP SERPL CALCULATED.3IONS-SCNC: 14 MMOL/L (ref 7–15)
BUN SERPL-MCNC: 25.5 MG/DL (ref 8–23)
CALCIUM SERPL-MCNC: 9.1 MG/DL (ref 8.8–10.2)
CHLORIDE SERPL-SCNC: 108 MMOL/L (ref 98–107)
CREAT SERPL-MCNC: 1.46 MG/DL (ref 0.51–0.95)
DEPRECATED HCO3 PLAS-SCNC: 20 MMOL/L (ref 22–29)
GFR SERPL CREATININE-BSD FRML MDRD: 36 ML/MIN/1.73M2
GLUCOSE SERPL-MCNC: 130 MG/DL (ref 70–99)
POTASSIUM SERPL-SCNC: 4.7 MMOL/L (ref 3.4–5.3)
SODIUM SERPL-SCNC: 142 MMOL/L (ref 136–145)
URATE SERPL-MCNC: 9.3 MG/DL (ref 2.4–5.7)

## 2023-08-10 PROCEDURE — 84550 ASSAY OF BLOOD/URIC ACID: CPT | Performed by: PHYSICIAN ASSISTANT

## 2023-08-10 PROCEDURE — 80048 BASIC METABOLIC PNL TOTAL CA: CPT | Performed by: PHYSICIAN ASSISTANT

## 2023-08-11 ENCOUNTER — OFFICE VISIT (OUTPATIENT)
Dept: SLEEP MEDICINE | Facility: CLINIC | Age: 83
End: 2023-08-11
Attending: INTERNAL MEDICINE
Payer: COMMERCIAL

## 2023-08-11 VITALS
WEIGHT: 198.06 LBS | OXYGEN SATURATION: 99 % | HEIGHT: 68 IN | BODY MASS INDEX: 30.02 KG/M2 | HEART RATE: 73 BPM | SYSTOLIC BLOOD PRESSURE: 132 MMHG | DIASTOLIC BLOOD PRESSURE: 72 MMHG

## 2023-08-11 DIAGNOSIS — E78.5 HYPERLIPIDEMIA LDL GOAL <100: ICD-10-CM

## 2023-08-11 DIAGNOSIS — G47.30 SLEEP-RELATED BREATHING DISORDER: Primary | ICD-10-CM

## 2023-08-11 DIAGNOSIS — R06.09 DYSPNEA ON EXERTION: ICD-10-CM

## 2023-08-11 PROBLEM — I49.3 PVC'S (PREMATURE VENTRICULAR CONTRACTIONS): Status: ACTIVE | Noted: 2020-06-10

## 2023-08-11 PROBLEM — N60.19 FIBROCYSTIC BREAST DISEASE: Status: ACTIVE | Noted: 2020-03-12

## 2023-08-11 PROBLEM — I10 ESSENTIAL HYPERTENSION: Status: ACTIVE | Noted: 2020-03-12

## 2023-08-11 PROCEDURE — 99205 OFFICE O/P NEW HI 60 MIN: CPT | Performed by: NURSE PRACTITIONER

## 2023-08-11 RX ORDER — ZOLPIDEM TARTRATE 5 MG/1
TABLET ORAL
Qty: 1 TABLET | Refills: 0 | Status: SHIPPED | OUTPATIENT
Start: 2023-08-11 | End: 2023-12-19

## 2023-08-11 NOTE — PROGRESS NOTES
Outpatient Sleep Medicine Consultation:      Name: Vkii Robert MRN# [id number]   Age: 83 year old YOB: 1940     Date of Consultation: August 11, 2023  Consultation is requested by: Theresa rOtiz MD  [address] [facility] [address]  [place] [address] Theresa Ortiz  Primary care provider: Yecenia Bundy       Reason for Sleep Consult:     Viki Robert is sent by Theresa Ortiz for a sleep consultation regarding dyspnea on exertion, hyperlipidemia    Patient s Reason for visit  Viki A Jakob main reason for visit: Cardiologist suggested possible sleep apnea  Patient states problem(s) started: always a very light sleeper--awake multiple times every night  Viki Robert's goals for this visit: Is sleep apnea a concern?           Assessment and Plan:     Summary Sleep Diagnoses:  Sleep-related breathing disorder  Multiple nocturnal awakenings  Snort arousals  Excessive daytime sleepiness  Nocturnal GERD  Bruxism  Shortness of breath lying flat, with activity, on awakening    Comorbid Diagnoses:  Hypertension  Dyspnea on exertion  Hyperlipidemia  PVCs  Fibrocystic breast disease  Obesity, class I  Coronary artery disease    Summary Recommendations:  Orders Placed This Encounter   Procedures    Comprehensive Sleep Study   1.  Recommend patient undergo sleep testing.  Patient STOP-BANG score is 4/8 indicating an increased pretest probability for obstructive sleep apnea.  PSG ordered.  2.  Recommend patient return to clinic approximately 2 weeks after sleep study has been completed.  At that time we will review the results as well as to determine plan of care going forward.  3.  Recommend patient optimize sleep schedule as well as his sleep hygiene practices.  This will mitigate any future sleep intrusion.  4.  Recommend patient employ safe driving practices such as not driving a car if drowsy.  5.  Weight management to BMI of 30    Summary Counseling:    Sleep Testing Reviewed: PSG  Obstructive  Sleep Apnea Reviewed   -Discussed pathophysiology of obstructive sleep apnea as well as central sleep apnea   -Discussed all methods of treatment of sleep apnea including PAP therapy, mandibular advancement device, surgical options  Complications of Untreated Sleep Apnea Reviewed in the context of his medical diagnoses      Medical Decision-making:   Educational materials provided in instructions    Total time spent reviewing medical records, history and physical examination, review of previous testing and interpretation as well as documentation on this date: 60 minutes    CC: Theresa Ortiz          History of Present Illness:     Viki Robert is an 83-year-old female who was sent to the sleep medicine clinic by her cardiologist, Theresa Ortiz.     Ms. 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  She has been followed by cardiology for many years for just that.  Over the past several years she has had Holter monitor readings which showed an increase in burden of the PVCs last noted to be 16%.  Her current cardiologist suggested she consider a coronary angiogram with right and left heart catheterization.  In addition to the PVCs, Ms. Robert is medical problems extend to dyspnea on exertion.  She states that it limits her ability to do certain activities that she was able to do before.  Patient did not want to have the heart catheterization as she was concerned about a reaction to the dye.  She reiterated that her father needed dialysis after he had a dye study in the past.  She also had another relative that met the same outcome.  Cardiologist then suggested seeing sleep medicine for an evaluation patient found that to be acceptable.    Patient does note mild difficulty with sleep maintenance insomnia.  She is moderately satisfied with her current sleep pattern.  Patient does also note that she has been an extremely light sleeper all her life.  She admits to snort arousals,  multiple nocturnal awakenings since she has been started on Lasix for heart failure, admits to dozing off unintentionally 4 episodes per week.  She knows she snores.  Also suffers from nocturnal GERD, bruxism, shortness of breath with activity, awakening with shortness of breath, swelling in her feet/legs, and overall deconditioning.  She has a STOP-BANG score of 4/8.    Plans for Ms. Robert include a polysomnographic study followed by a return visit to the sleep clinic approximately 2 weeks after the sleep study has been completed so we may discuss her results collaboratively and determine the next steps in her plan of care.  We did discuss treatment measures, and at this point, CPAP seems to be the only viable option.    Past Sleep Evaluations: None    SLEEP-WAKE SCHEDULE:     Work/School Days: Patient goes to school/work: No   Usually gets into bed at 12:30 am  Takes patient about 15 minutes to fall asleep  Has trouble falling asleep rarely, only if noise in the environment nights per week  Wakes up in the middle of the night 5 or 6 since taking lasix times.  Wakes up due to Snorting self awake;External stimuli (bed partner, pets, noise, etc);Use the bathroom  She has trouble falling back asleep 0 to 1 times a week.   It usually takes 5 minutes or less to get back to sleep  Patient is usually up at 9:30 am  Uses alarm: No    Weekends/Non-work Days/All Other Days:  Usually gets into bed at 12:30 am   Takes patient about 15 minutes to fall asleep  Patient is usually up at 9:30 am  Uses alarm: No    Sleep Need  Patient gets  8 hours sleep on average   Patient thinks she needs about 8 hours sleep    Viki Robert prefers to sleep in this position(s): Side;Stomach   Patient states they do the following activities in bed: Read;Watch TV;Other    Naps  Patient takes a purposeful nap 0 times a week and naps are usually N/A in duration  She feels better after a nap:    She dozes off unintentionally 4 days per week  Patient  has had a driving accident or near-miss due to sleepiness/drowsiness: No      SLEEP DISRUPTIONS:    Breathing/Snoring  Patient snores:Yes  Other people complain about her snoring: No  Patient has been told she stops breathing in her sleep:No  She has issues with the following: Heartburn or reflux at night;Getting up to urinate more than once    Movement:  Patient gets pain, discomfort, with an urge to move:  No  It happens when she is resting:     It happens more at night:     Patient has been told she kicks her legs at night:        Behaviors in Sleep:  Viki Robert has experienced the following behaviors while sleeping: Teeth grinding  She has experienced sudden muscle weakness during the day: Yes      Is there anything else you would like your sleep provider to know: Wake up with awareness of heart skipped beats (PVCs) and shortness of breath.        CAFFEINE AND OTHER SUBSTANCES:    Patient consumes caffeinated beverages per day:  Coke--3 or 4   12 oz servings  Last caffeine use is usually: 10 pm  List of any prescribed or over the counter stimulants that patient takes:    List of any prescribed or over the counter sleep medication patient takes:    List of previous sleep medications that patient has tried:    Patient drinks alcohol to help them sleep: No  Patient drinks alcohol near bedtime: No    Family History:  Patient has a family member been diagnosed with a sleep disorder: No            SCALES:    EPWORTH SLEEPINESS SCALE         8/4/2023    11:43 AM    [place] Sleepiness Scale ( JAYME Triplett  [id number]<br>ESS - [place]/English - Final version - 21 Nov 07 - [facility] [employer].)   Sitting and reading Moderate chance of dozing   Watching TV Moderate chance of dozing   Sitting, inactive in a public place (e.g. a theatre or a meeting) Would never doze   As a passenger in a car for an hour without a break Would never doze   Lying down to rest in the afternoon when circumstances permit Slight chance of dozing  "  Sitting and talking to someone Would never doze   Sitting quietly after a lunch without alcohol Would never doze   In a car, while stopped for a few minutes in traffic Would never doze   [place] Score (MC) 5   [place] Score (Sleep) 5         INSOMNIA SEVERITY INDEX (LOUIS)          8/4/2023    11:20 AM   Insomnia Severity Index (LOUIS)   Difficulty falling asleep 0   Difficulty staying asleep 1   Problems waking up too early 0   How SATISFIED/DISSATISFIED are you with your CURRENT sleep pattern? 2   How NOTICEABLE to others do you think your sleep problem is in terms of impairing the quality of your life? 1   How WORRIED/DISTRESSED are you about your current sleep problem? 1   To what extent do you consider your sleep problem to INTERFERE with your daily functioning (e.g. daytime fatigue, mood, ability to function at work/daily chores, concentration, memory, mood, etc.) CURRENTLY? 2   LOUIS Total Score 7       Guidelines for Scoring/Interpretation:  Total score categories:  0-7 = No clinically significant insomnia   8-14 = Subthreshold insomnia   15-21 = Clinical insomnia (moderate severity)  22-28 = Clinical insomnia (severe)  Used via courtesy of www.[employer]ooz[employer]alth.va.gov with permission from Bulmaro Sr PhD., [facility]      STOP BANG         8/11/2023     1:49 PM   STOP BANG Questionnaire (  2008, the American Society of Anesthesiologists, Inc. Janelle Darian [employer].)   Neck Cir (cm) Clinic: 40 cm   B/P Clinic: 132/72   BMI Clinic: 30.11         GAD7         No data to display                  CAGE-AID         No data to display                CAGE-AID reprinted with permission from the [place] Medical Journal, KARL Mehta. and KAR Kear[place], \"Conjoint screening questionnaires for alcohol and drug abuse\" [place] Medical Journal 94: 135-140, 1995.      PATIENT HEALTH QUESTIONNAIRE-9 (PHQ - 9)         No data to display                Developed by Reilly Tong, Abram Matute " Jaydon and colleagues, with an educational laina from [employer] No permission required to reproduce, translate, display or distribute.        Allergies:    Allergies   Allergen Reactions    Atorvastatin Unknown     Achy & weak    Hydrochlorothiazide Unknown     Light headed    Lodine [Etodolac] Unknown    Naprosyn [Naproxen] Unknown     Ankle swelling    Pravachol [Pravastatin] Unknown     Muscle aches & weakness    Statins [Statins] Muscle Pain (Myalgia)       Medications:    Current Outpatient Medications   Medication Sig Dispense Refill    aspirin 81 MG EC tablet [ASPIRIN 81 MG EC TABLET] Take 81 mg by mouth daily.      atenolol (TENORMIN) 25 MG tablet Take 2 tabs (50mg) in am and 1 tab (25mg) in pm. 270 tablet 1    furosemide (LASIX) 20 MG tablet Take 1 tablet (20 mg) by mouth daily as needed (swelling) 30 tablet 3    lansoprazole (PREVACID) 15 MG capsule [LANSOPRAZOLE (PREVACID) 15 MG CAPSULE] Take 15 mg by mouth daily.      losartan (COZAAR) 100 MG tablet Take 1 tablet (100 mg) by mouth daily 90 tablet 3    multivitamin (ONE A DAY) per tablet Take 1 tablet by mouth daily      cholecalciferol, vitamin D3, 1,000 unit (25 mcg) tablet [CHOLECALCIFEROL, VITAMIN D3, 1,000 UNIT (25 MCG) TABLET] Take 1,000 Units by mouth daily. (Patient not taking: Reported on 4/11/2023)         Problem List:  There are no problems to display for this patient.       Past Medical/Surgical History:  No past medical history on file.  Past Surgical History:   Procedure Laterality Date    BIOPSY BREAST Left 2000       Social History:  Social History     Socioeconomic History    Marital status: Single     Spouse name: Not on file    Number of children: Not on file    Years of education: Not on file    Highest education level: Not on file   Occupational History    Not on file   Tobacco Use    Smoking status: Never    Smokeless tobacco: Never   Substance and Sexual Activity    Alcohol use: Not on file    Drug use: Never    Sexual activity:  "Not on file   Other Topics Concern    Not on file   Social History Narrative    Not on file     Social Determinants of Health     Financial Resource Strain: Not on file   Food Insecurity: Not on file   Transportation Needs: Not on file   Physical Activity: Not on file   Stress: Not on file   Social Connections: Not on file   Intimate Partner Violence: Not on file   Housing Stability: Not on file       Family History:  Family History   Problem Relation Age of Onset    Breast Cancer Mother 90.00    Multiple myeloma Father 75.00    Breast Cancer Paternal Grandmother 100.00       Review of Systems:  A complete review of systems reviewed by me is negative with the exeption of what has been mentioned in the history of present illness.  In the last TWO WEEKS have you experienced any of the following symptoms?  Fevers: No  Night Sweats: No  Weight Gain: No  Pain at Night: No  Double Vision: No  Changes in Vision: No  Difficulty Breathing through Nose: No  Sore Throat in Morning: No  Dry Mouth in the Morning: No  Shortness of Breath Lying Flat: Yes  Shortness of Breath With Activity: Yes  Awakening with Shortness of Breath: Yes  Increased Cough: No  Heart Racing at Night: No  Swelling in Feet or Legs: Yes  Diarrhea at Night: No  Heartburn at Night: Yes  Urinating More than Once at Night: Yes  Losing Control of Urine at Night: No  Joint Pains at Night: No  Headaches in Morning: No  Weakness in Arms or Legs: Yes  Depressed Mood: No  Anxiety: No     Physical Examination:  Vitals: /72   Pulse 73   Ht 1.727 m (5' 8\")   Wt 89.8 kg (198 lb 1 oz)   SpO2 99%   BMI 30.12 kg/m    BMI= Body mass index is 30.12 kg/m .    Neck Cir (cm): 40 cm    Physical Exam  Vitals and nursing note reviewed.   Constitutional:       General: She is awake.      Appearance: Normal appearance. She is well-developed and well-groomed. She is obese.   HENT:      Head: Normocephalic and atraumatic.      Right Ear: External ear normal.      Left Ear: " External ear normal.      Nose: Nose normal.      Mouth/Throat:      Mouth: Mucous membranes are moist.      Pharynx: Oropharynx is clear.   Eyes:      Conjunctiva/sclera: Conjunctivae normal.   Cardiovascular:      Rate and Rhythm: Normal rate. Rhythm irregular.      Pulses: Normal pulses.      Heart sounds: Murmur heard.   Pulmonary:      Effort: Pulmonary effort is normal.      Breath sounds: Normal breath sounds.   Musculoskeletal:         General: Normal range of motion.      Cervical back: Normal range of motion and neck supple.   Skin:     General: Skin is warm and dry.      Capillary Refill: Capillary refill takes less than 2 seconds.   Neurological:      General: No focal deficit present.      Mental Status: She is alert and oriented to person, place, and time.   Psychiatric:         Mood and Affect: Mood normal.         Behavior: Behavior normal. Behavior is cooperative.         Thought Content: Thought content normal.         Judgment: Judgment normal.           Mallampati Class: III.  Tonsillar Stage: 0  surgically removed.         Data: All pertinent previous laboratory data reviewed     Recent Labs   Lab Test 08/10/23  1633 09/02/22  1145    141   POTASSIUM 4.7 4.2   CHLORIDE 108* 105   CO2 20* 24   ANIONGAP 14 12   * 113*   BUN 25.5* 18.1   CR 1.46* 1.33*   DORIE 9.1 9.8       Recent Labs   Lab Test 05/09/19  1025   WBC 9.6   RBC 4.55   HGB 14.1   HCT 42.2   MCV 93   MCH 31.0   MCHC 33.4   RDW 12.7          Recent Labs   Lab Test 09/02/22  1145   PROTTOTAL 7.0   ALBUMIN 4.2   BILITOTAL 0.7   ALKPHOS 75   AST 23   ALT 22       TSH (uIU/mL)   Date Value   03/06/2020 1.55       No results found for: UAMP, UBARB, BENZODIAZEUR, UCANN, UCOC, OPIT, UPCP    No results found for: IRONSAT, ET53826, ERVIN    No results found for: PH, PHARTERIAL, PO2, VC3TTXAMOIX, SAT, PCO2, HCO3, BASEEXCESS, AUSTIN, BEB    @LABRCNTIPR(phv:4,pco2v:4,po2v:4,hco3v:4,lewis:4,o2per:4)@    Echocardiology:   Name: MATIAS  NAI MARSHALL  MRN: [id number]  : 1940  Study Date: 10/04/2022 01:09 PM  Age: 82 yrs  Gender: Female  Patient Location: [facility]  Reason For Study: Dyspnea on exertion  Ordering Physician: JONATHAN BAIRD  Referring Physician: JONATHAN BAIRD  Performed By: MARCIA     BSA: 2.0 m2  Height: 68 in  Weight: 197 lb  HR: 74  BP: 138/85 mmHg  ______________________________________________________________________________  Procedure  Complete Echo Adult. Definity (NDC #[id number]) given intravenously.  ______________________________________________________________________________  Interpretation Summary     1. The left ventricle is normal in size. Left ventricular function is  normal.The ejection fraction is 55-60%. There is normal left ventricular wall  thickness. Left ventricular diastolic function is abnormal. No regional wall  motion abnormalities noted.  2. Normal right ventricle size and systolic function.  3. No hemodynamically significant valvular abnormalities on 2D or color flow  imaging.    Chest x-ray: No results found for this or any previous visit from the past 365 days.      Chest CT:   CT Chest w/o contrast 10/02/2022    Narrative  EXAM: CT CHEST W/O CONTRAST  LOCATION: [facility]  DATE/TIME: 10/2/2022 5:12 PM    INDICATION: Dyspnea on exertion.  COMPARISON: None.  TECHNIQUE: CT chest without IV contrast. Multiplanar reformats were obtained. Dose reduction techniques were used.  CONTRAST: None.    FINDINGS:  LUNGS AND PLEURA: Trace atelectasis and/or fibrosis. No acute infiltrates. No effusions.    MEDIASTINUM/AXILLAE: No gross adenopathy in the absence of contrast. Moderate sized hiatal hernia. No aneurysm.    CORONARY ARTERY CALCIFICATION: Mild.    UPPER ABDOMEN: No acute findings.    MUSCULOSKELETAL: No frankly destructive bony lesions.    Impression  IMPRESSION:  1.  Trace atelectasis and/or fibrosis, lungs otherwise clear.      PFT: Most Recent Breeze Pulmonary Function  Testing    No results found for: 20001  No results found for: 20002  No results found for: 20003  No results found for: 20015  No results found for: 20016  No results found for: 20027  No results found for: 20028  No results found for: 20029  No results found for: 20079  No results found for: 20080  No results found for: 20081  No results found for: 20335  No results found for: 20105  No results found for: 20053  No results found for: 20054  No results found for: 20055      RANCHO Rojas CNP 8/11/2023   Sleep Medicine    This note was written with the assistance of the Dragon voice-dictation technology software. The final document, although reviewed, may contain errors. For corrections, please contact the office.

## 2023-08-11 NOTE — NURSING NOTE
"Chief Complaint   Patient presents with    Consult     Referred by cardiologist for sleep apnea       Initial /72   Pulse 73   Ht 1.727 m (5' 8\")   Wt 89.8 kg (198 lb 1 oz)   SpO2 99%   BMI 30.12 kg/m   Estimated body mass index is 30.12 kg/m  as calculated from the following:    Height as of this encounter: 1.727 m (5' 8\").    Weight as of this encounter: 89.8 kg (198 lb 1 oz).    Medication Reconciliation: complete    Neck circumference:  inches / 40 centimeters.    DME:     [facility]      "

## 2023-08-11 NOTE — PATIENT INSTRUCTIONS
"          MY TREATMENT INFORMATION FOR SLEEP APNEA-  Viki Robert    DOCTOR : RANCHO Rojas CNP    Am I having a sleep study at a sleep center?  --->Due to normal delays, you will be contacted within 2-4 weeks to schedule      Frequently asked questions:  1. What is Obstructive Sleep Apnea (VIANEY)? VIANEY is the most common type of sleep apnea. Apnea means, \"without breath.\"  Apnea is most often caused by narrowing or collapse of the upper airway as muscles relax during sleep.   Almost everyone has occasional apneas. Most people with sleep apnea have had brief interruptions at night frequently for many years.  The severity of sleep apnea is related to how frequent and severe the events are.   2. What are the consequences of VIANEY? Symptoms include: feeling sleepy during the day, snoring loudly, gasping or stopping of breathing, trouble sleeping, and occasionally morning headaches or heartburn at night.  Sleepiness can be serious and even increase the risk of falling asleep while driving. Other health consequences may include development of high blood pressure and other cardiovascular disease in persons who are susceptible. Untreated VIANEY  can contribute to heart disease, stroke and diabetes.   3. What are the treatment options? In most situations, sleep apnea is a lifelong disease that must be managed with daily therapy. Medications are not effective for sleep apnea and surgery is generally not considered until other therapies have been tried. Your treatment is your choice . Continuous Positive Airway (CPAP) works right away and is the therapy that is effective in nearly everyone. An oral device to hold your jaw forward is usually the next most reliable option. Other options include postioning devices (to keep you off your back), weight loss, and surgery including a tongue pacing device. There is more detail about some of these options below.  4. Are my sleep studies covered by insurance? Although we will request " verification of coverage, we advise you also check in advance of the study to ensure there is coverage.    Important tips for those choosing CPAP and similar devices  For new devices, sign up for device JOCELYNE to monitor your device for your followup visits  We encourage you to utilize the Jajah jocelyne or website (myAir web (resmed.com) ) to monitor your therapy progress and share the data with your healthcare team when you discuss your sleep apnea.                                                    Know your equipment:  CPAP is continuous positive airway pressure that prevents obstructive sleep apnea by keeping the throat from collapsing while you are sleeping. In most cases, the device is  smart  and can slowly self-adjusts if your throat collapses and keeps a record every day of how well you are treated-this information is available to you and your care team.  BPAP is bilevel positive airway pressure that keeps your throat open and also assists each breath with a pressure boost to maintain adequate breathing.  Special kinds of BPAP are used in patients who have inadequate breathing from lung or heart disease. In most cases, the device is  smart  and can slowly self-adjusts to assist breathing. Like CPAP, the device keeps a record of how well you are treated.  Your mask is your connection to the device. You get to choose what feels most comfortable and the staff will help to make sure if fits. Here: are some examples of the different masks that are available:       Key points to remember on your journey with sleep apnea:  Sleep study.  PAP devices often need to be adjusted during a sleep study to show that they are effective and adjusted right.  Good tips to remember: Try wearing just the mask during a quiet time during the day so your body adapts to wearing it. A humidifier is recommended for comfort in most cases to prevent drying of your nose and throat. Allergy medication from your provider may help you if  you are having nasal congestion.  Getting settled-in. It takes more than one night for most of us to get used to wearing a mask. Try wearing just the mask during a quiet time during the day so your body adapts to wearing it. A humidifier is recommended for comfort in most cases. Our team will work with you carefully on the first day and will be in contact within 4 days and again at 2 and 4 weeks for advice and remote device adjustments. Your therapy is evaluated by the device each day.   Use it every night. The more you are able to sleep naturally for 7-8 hours, the more likely you will have good sleep and to prevent health risks or symptoms from sleep apnea. Even if you use it 4 hours it helps. Occasionally all of us are unable to use a medical therapy, in sleep apnea, it is not dangerous to miss one night.   Communicate. Call our skilled team on the number provided on the first day if your visit for problems that make it difficult to wear the device. Over 2 out of 3 patients can learn to wear the device long-term with help from our team. Remember to call our team or your sleep providers if you are unable to wear the device as we may have other solutions for those who cannot adapt to mask CPAP therapy. It is recommended that you sleep your sleep provider within the first 3 months and yearly after that if you are not having problems.   Use it for your health. We encourage use of CPAP masks during daytime quiet periods to allow your face and brain to adapt to the sensation of CPAP so that it will be a more natural sensation to awaken to at night or during naps. This can be very useful during the first few weeks or months of adapting to CPAP though it does not help medically to wear CPAP during wakefulness and  should not be used as a strategy just to meet guidelines.  Take care of your equipment. Make sure you clean your mask and tubing using directions every day and that your filter and mask are replaced as  recommended or if they are not working.     BESIDES CPAP, WHAT OTHER THERAPIES ARE THERE?    Positioning Device  Positioning devices are generally used when sleep apnea is mild and only occurs on your back.This example shows a pillow that straps around the waist. It may be appropriate for those whose sleep study shows milder sleep apnea that occurs primarily when lying flat on one's back. Preliminary studies have shown benefit but effectiveness at home may need to be verified by a home sleep test. These devices are generally not covered by medical insurance.  Examples of devices that maintain sleeping on the back to prevent snoring and mild sleep apnea.    Belt type body positioner  http://[employer]/    Electronic reminder  http://nightshifttherapy.com/            Oral Appliance  What is oral appliance therapy?  An oral appliance device fits on your teeth at night like a retainer used after having braces. The device is made by a specialized dentist and requires several visits over 1-2 months before a manufactured device is made to fit your teeth and is adjusted to prevent your sleep apnea. Once an oral device is working properly, snoring should be improved. A home sleep test may be recommended at that time if to determine whether the sleep apnea is adequately treated.       Some things to remember:  -Oral devices are often, but not always, covered by your medical insurance. Be sure to check with your insurance provider.   -If you are referred for oral therapy, you will be given a list of specialized dentists to consider or you may choose to visit the Web site of the American Academy of Dental Sleep Medicine  -Oral devices are less likely to work if you have severe sleep apnea or are extremely overweight.     More detailed information  An oral appliance is a small acrylic device that fits over the upper and lower teeth  (similar to a retainer or a mouth guard). This device slightly moves jaw forward, which moves  the base of the tongue forward, opens the airway, improves breathing for effective treat snoring and obstructive sleep apnea in perhaps 7 out of 10 people .  The best working devices are custom-made by a dental device  after a mold is made of the teeth 1, 2, 3.  When is an oral appliance indicated?  Oral appliance therapy is recommended as a first-line treatment for patients with primary snoring, mild sleep apnea, and for patients with moderate sleep apnea who prefer appliance therapy to use of CPAP4, 5. Severity of sleep apnea is determined by sleep testing and is based on the number of respiratory events per hour of sleep.   How successful is oral appliance therapy?  The success rate of oral appliance therapy in patients with mild sleep apnea is 75-80% while in patients with moderate sleep apnea it is 50-70%. The chance of success in patients with severe sleep apnea is 40-50%. The research also shows that oral appliances have a beneficial effect on the cardiovascular health of VIANEY patients at the same magnitude as CPAP therapy7.  Oral appliances should be a second-line treatment in cases of severe sleep apnea, but if not completely successful then a combination therapy utilizing CPAP plus oral appliance therapy may be effective. Oral appliances tend to be effective in a broad range of patients although studies show that the patients who have the highest success are females, younger patients, those with milder disease, and less severe obesity. 3, 6.   Finding a dentist that practices dental sleep medicine  Specific training is available through the American Academy of Dental Sleep Medicine for dentists interested in working in the field of sleep. To find a dentist who is educated in the field of sleep and the use of oral appliances, near you, visit the Web site of the American Academy of Dental Sleep Medicine.    References  1. gaatha Harmon al. Objectively measured vs self-reported compliance during  oral appliance therapy for sleep-disordered breathing. Chest 2013; 144(5): 1320-1975.  2. Karla, et al. Objective measurement of compliance during oral appliance therapy for sleep-disordered breathing. Thorax 2013; 68(1): 91-96.  3. Leo et al. Mandibular advancement devices in 620 men and women with VIANEY and snoring: tolerability and predictors of treatment success. Chest 2004; 125: 9210-2146.  4. Grace, et al. Oral appliances for snoring and VIANEY: a review. Sleep 2006; 29: 244-262.  5. Kaushal et al. Oral appliance treatment for VIANEY: an update. J Clin Sleep Med 2014; 10(2): 215-227.  6. Deborah et al. Predictors of OSAH treatment outcome. J Dent Res 2007; 86: 3792-1361.      Weight Loss:    Weight loss is a long-term strategy that may improve sleep apnea in some patients.    Weight management is a personal decision and the decision should be based on your interest and the potential benefits.  If you are interested in exploring weight loss strategies, the following discussion covers the impact on weight loss on sleep apnea and the approaches that may be successful.    Being overweight does not necessarily mean you will have health consequences.  Those who have BMI over 35 or over 27 with existing medical conditions carries greater risk.   Weight loss decreases severity of sleep apnea in most people with obesity. For those with mild obesity who have developed snoring with weight gain, even 15-30 pound weight loss can improve and occasionally eliminate sleep apnea.  Structured and life-long dietary and health habits are necessary to lose weight and keep healthier weight levels.     Though there may be significant health benefits from weight loss, long-term weight loss is very difficult to achieve- studies show success with dietary management in less than 10% of people. In addition, substantial weight loss may require years of dietary control and may be difficult if patients have severe obesity. In  these cases, surgical management may be considered.  Finally, older individuals who have tolerated obesity without health complications may be less likely to benefit from weight loss strategies.      [unfilled]    Surgery:    Surgery for obstructive sleep apnea is considered generally only when other therapies fail to work. Surgery may be discussed with you if you are having a difficult time tolerating CPAP and or when there is an abnormal structure that requires surgical correction.  Nose and throat surgeries often enlarge the airway to prevent collapse.  Most of these surgeries create pain for 1-2 weeks and up to half of the most common surgeries are not effective throughout life.  You should carefully discuss the benefits and drawbacks to surgery with your sleep provider and surgeon to determine if it is the best solution for you.   More information  Surgery for VIANEY is directed at areas that are responsible for narrowing or complete obstruction of the airway during sleep.  There are a wide range of procedures available to enlarge and/or stabilize the airway to prevent blockage of breathing in the three major areas where it can occur: the palate, tongue, and nasal regions.  Successful surgical treatment depends on the accurate identification of the factors responsible for obstructive sleep apnea in each person.  A personalized approach is required because there is no single treatment that works well for everyone.  Because of anatomic variation, consultation with an examination by a sleep surgeon is a critical first step in determining what surgical options are best for each patient.  In some cases, examination during sedation may be recommended in order to guide the selection of procedures.  Patients will be counseled about risks and benefits as well as the typical recovery course after surgery. Surgery is typically not a cure for a person s VIANEY.  However, surgery will often significantly improve one s VIANEY  severity (termed  success rate ).  Even in the absence of a cure, surgery will decrease the cardiovascular risk associated with OSA7; improve overall quality of life8 (sleepiness, functionality, sleep quality, etc).      Palate Procedures:  Patients with VIANEY often have narrowing of their airway in the region of their tonsils and uvula.  The goals of palate procedures are to widen the airway in this region as well as to help the tissues resist collapse.  Modern palate procedure techniques focus on tissue conservation and soft tissue rearrangement, rather than tissue removal.  Often the uvula is preserved in this procedure. Residual sleep apnea is common in patient after pharyngoplasty with an average reduction in sleep apnea events of 33%2.      Tongue Procedures:  ExamWhile patients are awake, the muscles that surround the throat are active and keep this region open for breathing. These muscles relax during sleep, allowing the tongue and other structures to collapse and block breathing.  There are several different tongue procedures available.  Selection of a tongue base procedure depends on characteristics seen on physical exam.  Generally, procedures are aimed at removing bulky tissues in this area or preventing the back of the tongue from falling back during sleep.  Success rates for tongue surgery range from 50-62%3.    Hypoglossal Nerve Stimulation:  Hypoglossal nerve stimulation has recently received approval from the [place] Food and Drug Administration for the treatment of obstructive sleep apnea.  This is based on research showing that the system was safe and effective in treating sleep apnea6.  Results showed that the median AHI score decreased 68%, from 29.3 to 9.0. This therapy uses an implant system that senses breathing patterns and delivers mild stimulation to airway muscles, which keeps the airway open during sleep.  The system consists of three fully implanted components: a small generator  (similar in size to a pacemaker), a breathing sensor, and a stimulation lead.  Using a small handheld remote, a patient turns the therapy on before bed and off upon awakening.    Candidates for this device must be greater than 18 years of age, have moderate to severe VIANEY (AHI between 15-65), BMI less than 35, have tried CPAP/oral appliance for at least 8 weeks without success, and have appropriate upper airway anatomy (determined by a sleep endoscopy performed by Dr. Carrington Balderrama).    Hypoglossal Nerve Stimulation Pathway:    The sleep surgeon s office will work with the patient through the insurance prior-authorization process (including communications and appeals).    Nasal Procedures:  Nasal obstruction can interfere with nasal breathing during the day and night.  Studies have shown that relief of nasal obstruction can improve the ability of some patients to tolerate positive airway pressure therapy for obstructive sleep apnea1.  Treatment options include medications such as nasal saline, topical corticosteroid and antihistamine sprays, and oral medications such as antihistamines or decongestants. Non-surgical treatments can include external nasal dilators for selected patients. If these are not successful by themselves, surgery can improve the nasal airway either alone or in combination with these other options.      Combination Procedures:  Combination of surgical procedures and other treatments may be recommended, particularly if patients have more than one area of narrowing or persistent positional disease.  The success rate of combination surgery ranges from 66-80%2,3.    References  Indy PAIGE. The Role of the Nose in Snoring and Obstructive Sleep Apnoea: An Update.  Eur Arch Otorhinolaryngol. 2011; 268: 1365-73.   Raul SM; Beronica JA; Sierra JR; Pallanch JF; Lisa PAUL; Kvng ZHONG; Kelvin BROWN. Surgical modifications of the upper airway for obstructive sleep apnea in adults: a systematic review and  meta-analysis. SLEEP 2010;33(10):6880-8781. Genoveva KEATING. Hypopharyngeal surgery in obstructive sleep apnea: an evidence-based medicine review.  Arch Otolaryngol Head Neck Surg. 2006 Feb;132(2):206-13.  Jeramie YJORGE, Rocky Y, Inocente ARJUN. The efficacy of anatomically based multilevel surgery for obstructive sleep apnea. Otolaryngol Head Neck Surg. 2003 Oct;129(4):327-35.  Kezirian E, Goldberg A. Hypopharyngeal Surgery in Obstructive Sleep Apnea: An Evidence-Based Medicine Review. Arch Otolaryngol Head Neck Surg. 2006 Feb;132(2):206-13.  Ross PJ et al. Upper-Airway Stimulation for Obstructive Sleep Apnea.  N Engl J Med. 2014 Jan 9;370(2):139-49.  Jose Antonio Y et al. Increased Incidence of Cardiovascular Disease in Middle-aged Men with Obstructive Sleep Apnea. Am J Respir Crit Care Med; 2002 166: 159-165  Herrera EM et al. Studying Life Effects and Effectiveness of Palatopharyngoplasty (SLEEP) study: Subjective Outcomes of Isolated Uvulopalatopharyngoplasty. Otolaryngol Head Neck Surg. 2011; 144: 623-631.        WHAT IF I ONLY HAVE SNORING?    Mandibular advancement devices, lateral sleep positioning, long-term weight loss and treatment of nasal allergies have been shown to improve snoring.  Exercising tongue muscles with a game (https://apps.[employer].Pintics/us/jocelyne/soundly-reduce-snoring/fs8808404949) or stimulating the tongue during the day with a device (https://doi.org/10.3390/qyz10805227) have improved snoring in some individuals.    Remember to Drive Safe... Drive Alive     Sleep health profoundly affects your health, mood, and your safety.  Thirty three percent of the population (one in three of us) is not getting enough sleep and many have a sleep disorder. Not getting enough sleep or having an untreated / undertreated sleep condition may make us sleepy without even knowing it. In fact, our driving could be dramatically impaired due to our sleep health. As your provider, here are some things I would like you to know about  driving:     Here are some warning signs for impairment and dangerous drowsy driving:              -Having been awake more than 16 hours               -Looking tired               -Eyelid drooping              -Head nodding (it could be too late at this point)              -Driving for more than 30 minutes     Some things you could do to make the driving safer if you are experiencing some drowsiness:              -Stop driving and rest              -Call for transportation              -Make sure your sleep disorder is adequately treated     Some things that have been shown NOT to work when experiencing drowsiness while driving:              -Turning on the radio              -Opening windows              -Eating any  distracting  /  entertaining  foods (e.g., sunflower seeds, candy, or any other)              -Talking on the phone      Your decision may not only impact your life, but also the life of others. Please, remember to drive safe for yourself and all of us.

## 2023-08-11 NOTE — NURSING NOTE
Sleep study and return visit has been scheduled. AVS and PSG packet handouts given to patient.     Katrin Galan [facility] [place]

## 2023-08-21 ENCOUNTER — TRANSFERRED RECORDS (OUTPATIENT)
Dept: HEALTH INFORMATION MANAGEMENT | Facility: CLINIC | Age: 83
End: 2023-08-21

## 2023-09-12 ENCOUNTER — LAB REQUISITION (OUTPATIENT)
Dept: LAB | Facility: CLINIC | Age: 83
End: 2023-09-12

## 2023-09-12 DIAGNOSIS — N18.32 CHRONIC KIDNEY DISEASE, STAGE 3B (H): ICD-10-CM

## 2023-09-12 PROCEDURE — 80048 BASIC METABOLIC PNL TOTAL CA: CPT | Performed by: PHYSICIAN ASSISTANT

## 2023-09-12 PROCEDURE — 84550 ASSAY OF BLOOD/URIC ACID: CPT | Performed by: PHYSICIAN ASSISTANT

## 2023-09-13 LAB
ANION GAP SERPL CALCULATED.3IONS-SCNC: 12 MMOL/L (ref 7–15)
BUN SERPL-MCNC: 16.5 MG/DL (ref 8–23)
CALCIUM SERPL-MCNC: 9.5 MG/DL (ref 8.8–10.2)
CHLORIDE SERPL-SCNC: 105 MMOL/L (ref 98–107)
CREAT SERPL-MCNC: 1.3 MG/DL (ref 0.51–0.95)
DEPRECATED HCO3 PLAS-SCNC: 22 MMOL/L (ref 22–29)
EGFRCR SERPLBLD CKD-EPI 2021: 41 ML/MIN/1.73M2
GLUCOSE SERPL-MCNC: 142 MG/DL (ref 70–99)
POTASSIUM SERPL-SCNC: 4.6 MMOL/L (ref 3.4–5.3)
SODIUM SERPL-SCNC: 139 MMOL/L (ref 136–145)
URATE SERPL-MCNC: 6.6 MG/DL (ref 2.4–5.7)

## 2023-09-25 DIAGNOSIS — I10 BENIGN ESSENTIAL HYPERTENSION: ICD-10-CM

## 2023-09-26 RX ORDER — LOSARTAN POTASSIUM 100 MG/1
100 TABLET ORAL DAILY
Qty: 90 TABLET | Refills: 0 | Status: SHIPPED | OUTPATIENT
Start: 2023-09-26 | End: 2023-12-19

## 2023-09-28 DIAGNOSIS — I49.3 PVC'S (PREMATURE VENTRICULAR CONTRACTIONS): ICD-10-CM

## 2023-09-29 RX ORDER — ATENOLOL 25 MG/1
TABLET ORAL
Qty: 270 TABLET | Refills: 0 | Status: SHIPPED | OUTPATIENT
Start: 2023-09-29 | End: 2023-12-19

## 2023-10-17 ENCOUNTER — LAB REQUISITION (OUTPATIENT)
Dept: LAB | Facility: CLINIC | Age: 83
End: 2023-10-17

## 2023-10-17 ENCOUNTER — TRANSFERRED RECORDS (OUTPATIENT)
Dept: HEALTH INFORMATION MANAGEMENT | Facility: CLINIC | Age: 83
End: 2023-10-17
Payer: COMMERCIAL

## 2023-10-17 DIAGNOSIS — I10 ESSENTIAL (PRIMARY) HYPERTENSION: ICD-10-CM

## 2023-10-17 DIAGNOSIS — M10.9 GOUT, UNSPECIFIED: ICD-10-CM

## 2023-10-17 LAB
ANION GAP SERPL CALCULATED.3IONS-SCNC: 13 MMOL/L (ref 7–15)
BUN SERPL-MCNC: 16.5 MG/DL (ref 8–23)
CALCIUM SERPL-MCNC: 9.7 MG/DL (ref 8.8–10.2)
CHLORIDE SERPL-SCNC: 105 MMOL/L (ref 98–107)
CREAT SERPL-MCNC: 1.32 MG/DL (ref 0.51–0.95)
DEPRECATED HCO3 PLAS-SCNC: 22 MMOL/L (ref 22–29)
EGFRCR SERPLBLD CKD-EPI 2021: 40 ML/MIN/1.73M2
GLUCOSE SERPL-MCNC: 117 MG/DL (ref 70–99)
POTASSIUM SERPL-SCNC: 4.1 MMOL/L (ref 3.4–5.3)
SODIUM SERPL-SCNC: 140 MMOL/L (ref 135–145)
URATE SERPL-MCNC: 6.2 MG/DL (ref 2.4–5.7)

## 2023-10-17 PROCEDURE — 80048 BASIC METABOLIC PNL TOTAL CA: CPT | Performed by: PHYSICIAN ASSISTANT

## 2023-10-17 PROCEDURE — 84550 ASSAY OF BLOOD/URIC ACID: CPT | Performed by: PHYSICIAN ASSISTANT

## 2023-10-25 ENCOUNTER — OFFICE VISIT (OUTPATIENT)
Dept: CARDIOLOGY | Facility: CLINIC | Age: 83
End: 2023-10-25
Attending: INTERNAL MEDICINE
Payer: COMMERCIAL

## 2023-10-25 VITALS
SYSTOLIC BLOOD PRESSURE: 124 MMHG | HEIGHT: 68 IN | BODY MASS INDEX: 29.63 KG/M2 | DIASTOLIC BLOOD PRESSURE: 76 MMHG | OXYGEN SATURATION: 96 % | HEART RATE: 85 BPM | WEIGHT: 195.5 LBS | RESPIRATION RATE: 16 BRPM

## 2023-10-25 DIAGNOSIS — R06.09 DYSPNEA ON EXERTION: Primary | ICD-10-CM

## 2023-10-25 DIAGNOSIS — I10 ESSENTIAL HYPERTENSION: ICD-10-CM

## 2023-10-25 DIAGNOSIS — E78.5 HYPERLIPIDEMIA LDL GOAL <100: ICD-10-CM

## 2023-10-25 DIAGNOSIS — I25.10 CORONARY ARTERY DISEASE INVOLVING NATIVE CORONARY ARTERY OF NATIVE HEART WITHOUT ANGINA PECTORIS: ICD-10-CM

## 2023-10-25 DIAGNOSIS — I49.3 PVC'S (PREMATURE VENTRICULAR CONTRACTIONS): ICD-10-CM

## 2023-10-25 PROCEDURE — 99214 OFFICE O/P EST MOD 30 MIN: CPT | Performed by: STUDENT IN AN ORGANIZED HEALTH CARE EDUCATION/TRAINING PROGRAM

## 2023-10-25 RX ORDER — ALLOPURINOL 100 MG/1
150 TABLET ORAL DAILY
COMMUNITY
Start: 2023-08-11

## 2023-10-25 NOTE — PATIENT INSTRUCTIONS
Viki Robert,    It was a pleasure to see you today at the [facility].     My recommendations after this visit include:    - Continue current medications.   - Monitor the shortness of breath- if worsening or having chest pain with this would recommend additional testing  - Monitor the PVC's- if having more can increase atenolol to 50 mg twice a day and may consider a repeat holter monitor  - Follow up with Dr. Ortiz in 6 months, sooner if needed.     - Please call [phone], if you have any questions or concerns      Chichi Hermosillo PA-C

## 2023-10-25 NOTE — LETTER
10/25/2023    Yecenia Bundy MD  [address] [place] [address]    RE: Viki Robert       Dear Colleague,     I had the pleasure of seeing Viki Robert in the [facility].    HEART CARE ENCOUNTER NOTE      [facility]  [phone]      Assessment/Recommendations   Assessment:   Coronary artery disease: Mild as noted on previous CT calcium scoring in 2015 showing left circumflex score of 23.  Takes aspirin 81 mg daily.  Denies angina.   PVCs: Holter April 2023 showed PVC burden increase from 5 to 15%.  On atenolol 50 mg in the morning and 25 mg in the evening.  Patient notes that PVCs were worse in the summer but the past few weeks they have been much better.  Hypertension: Controlled on atenolol 50 mg in morning and 25 mg daily, losartan 100 mg daily  Hyperlipidemia: Statin and Zetia intolerance.  Not interested in starting Repatha.  Dyspnea on exertion: Likely multifactorial, suspect at least in part due to deconditioning.  Patient does not exercise as she notes she gets short of breath walking short distances such as from a parking lot.  Patient denies angina with exertion.  Discussed work-up including CT coronary angiogram to rule out coronary artery stenosis as a cause of shortness of breath, which patient declined at this time.   Lower extremity edema: Resolved.  Takes furosemide 20 mg daily as needed, last time taking this was July 2023.      Plan:   Sleep medicine study as scheduled November 13.  Continue to monitor shortness of breath.  If this worsens or is accompanied with angina should consider additional testing.  Patient declined any additional work-up at this time.  Continue to monitor PVCs.  Patient notes these have improved the past few weeks.  If worsening could consider increasing atenolol and/or reassessing burden with Holter monitor.  Follow-up with Dr. Ortiz in 6 months, sooner if needed        The level of medical decision making during this  visit was of moderate complexity.    Follow up in 6 months with Dr. Ortiz.      History of Present Illness/Subjective    HPI: Viki Robert is a 83 year old female with PMHx of mild coronary artery disease noted on previous CT calcium score, hypertension, severe dyslipidemia with statin and Zetia intolerance, history of PVCs presents for follow-up.  Patient has had Holter monitors in the past to evaluate the burden of her PVCs.  Last Holter 4/14/2023 showed 15% PVC burden.  Patient was started on atenolol 50 mg in the morning and 25 mg in the evening due to side effects from metoprolol in the past.  Patient has also had evidence of abnormal diastolic function on echocardiogram and was discussed about whether to pursue a coronary angiogram with left and right heart catheterization.  Patient did not want to proceed as she had concerns about a reaction to the dye.     Patient notes that over the summer she was diagnosed with gout.  Since that time she has made some dietary changes and does stop drinking caffeine.  PVCs/palpitations more frequent over the summer but patient notes the past few weeks these have been improved.  Possible that discontinuing caffeine and increasing water intake may be one of the reasons.  Patient notes her lower extremity edema has resolved.  Has furosemide that she takes as needed but has not needed it since July.  Does endorse shortness of breath with exertion which has been present for the past 5 years and gradually is worsening.  She notes she is unable to walk from the parking lot without getting short of breath.  Is interested in handicap parking permit.  Patient denies any angina with exertion.  She denies fatigue, lightheadedness, orthopnea, PND, chest pain, abdominal fullness/bloating, and lower extremity edema.      Echocardiogram 10/4/2022 results:  1. The left ventricle is normal in size. Left ventricular function is  normal.The ejection fraction is 55-60%. There is normal left  "ventricular wall  thickness. Left ventricular diastolic function is abnormal. No regional wall  motion abnormalities noted.  2. Normal right ventricle size and systolic function.  3. No hemodynamically significant valvular abnormalities on 2D or color flow  imaging.    Holter monitor 4/14/2023:  Holter monitoring from 4/14/2023 to 4/15/2023 (duration 24hrs). Predominant underlying rhythm was sinus rhythm, 53 to 95bpm, average 71bpm. No nonsustained or sustained tachyarrhythmias. No atrial fibrillation. There were no pauses of greater than 3 seconds. Rare supraventricular ectopic beats (<1%). Frequent premature ventricular contractions (15%). These were substantially more frequent during daytime hours. Symptom triggers correlated to sinus rhythm with PVCs     Physical Examination  Review of Systems   Vitals: /76 (BP Location: Right arm, Patient Position: Sitting, Cuff Size: Adult Large)   Pulse 85   Resp 16   Ht 1.727 m (5' 8\")   Wt 88.7 kg (195 lb 8 oz)   SpO2 96%   BMI 29.73 kg/m    BMI= Body mass index is 29.73 kg/m .  Wt Readings from Last 3 Encounters:   10/25/23 88.7 kg (195 lb 8 oz)   08/11/23 89.8 kg (198 lb 1 oz)   04/11/23 88.9 kg (196 lb)           ENT/Mouth: membranes moist   EYES:  no scleral icterus, normal conjunctivae                    Neck: No carotid bruit or thyromegaly   Chest/Lungs:   lungs are clear to auscultation, no rales or wheezing, equal chest wall expansion    Cardiovascular:   Regular. Normal first and second heart sounds with no murmurs, rubs, or gallops; the carotid, radial and posterior tibial pulses are intact, Jugular venous pressure normal, no edema bilaterally        Extremities: no cyanosis or clubbing   Skin: no xanthelasma, warm.    Neurologic: no tremors     Psychiatric: alert and oriented x3, calm        Please refer above for cardiac ROS details.        Medical History  Surgical History Family History Social History   History reviewed. No pertinent past medical " history.  Past Surgical History:   Procedure Laterality Date    BIOPSY BREAST Left 2000     Family History   Problem Relation Age of Onset    Breast Cancer Mother 90.00    Multiple myeloma Father 75.00    Breast Cancer Paternal Grandmother 100.00        Social History     Socioeconomic History    Marital status: Single     Spouse name: Not on file    Number of children: Not on file    Years of education: Not on file    Highest education level: Not on file   Occupational History    Not on file   Tobacco Use    Smoking status: Never    Smokeless tobacco: Never   Substance and Sexual Activity    Alcohol use: Not on file    Drug use: Never    Sexual activity: Not on file   Other Topics Concern    Not on file   Social History Narrative    Not on file     Social Determinants of Health     Financial Resource Strain: Not on file   Food Insecurity: Not on file   Transportation Needs: Not on file   Physical Activity: Not on file   Stress: Not on file   Social Connections: Not on file   Interpersonal Safety: Not on file   Housing Stability: Not on file           Medications  Allergies   Current Outpatient Medications   Medication Sig Dispense Refill    allopurinol (ZYLOPRIM) 100 MG tablet Take 150 mg by mouth daily      aspirin 81 MG EC tablet [ASPIRIN 81 MG EC TABLET] Take 81 mg by mouth daily.      atenolol (TENORMIN) 25 MG tablet TAKE 2 TABLETS IN THE MORNING AND 1 TABLET IN THE EVENING (DOSE INCREASED PER CLL 4/20/23) 270 tablet 0    lansoprazole (PREVACID) 15 MG capsule [LANSOPRAZOLE (PREVACID) 15 MG CAPSULE] Take 15 mg by mouth daily.      losartan (COZAAR) 100 MG tablet TAKE 1 TABLET DAILY 90 tablet 0    multivitamin (ONE A DAY) per tablet Take 1 tablet by mouth daily      zolpidem (AMBIEN) 5 MG tablet Take tablet by mouth 15 minutes prior to sleep, for Sleep Study 1 tablet 0    cholecalciferol, vitamin D3, 1,000 unit (25 mcg) tablet [CHOLECALCIFEROL, VITAMIN D3, 1,000 UNIT (25 MCG) TABLET] Take 1,000 Units by mouth  "daily. (Patient not taking: Reported on 4/11/2023)      furosemide (LASIX) 20 MG tablet Take 1 tablet (20 mg) by mouth daily as needed (swelling) (Patient not taking: Reported on 10/25/2023) 30 tablet 3       Allergies   Allergen Reactions    Atorvastatin Unknown     Achy & weak    Hydrochlorothiazide Unknown     Light headed    Lodine [Etodolac] Unknown    Naprosyn [Naproxen] Unknown     Ankle swelling    Pravachol [Pravastatin] Unknown     Muscle aches & weakness    Statins [Statins] Muscle Pain (Myalgia)          Lab Results    Chemistry/lipid CBC Cardiac Enzymes/BNP/TSH/INR   Recent Labs   Lab Test 09/02/22  1145   CHOL 283*   HDL 37*   *   TRIG 335*     Recent Labs   Lab Test 09/02/22  1145 09/03/21  1547 07/21/20  1404   * 187* 213*     Recent Labs   Lab Test 10/17/23  1045      POTASSIUM 4.1   CHLORIDE 105   CO2 22   *   BUN 16.5   CR 1.32*   GFRESTIMATED 40*   DOIRE 9.7     Recent Labs   Lab Test 10/17/23  1045 09/12/23  1413 08/10/23  1633   CR 1.32* 1.30* 1.46*     Recent Labs   Lab Test 05/09/19  1025   A1C 6.1          Recent Labs   Lab Test 05/09/19  1025   WBC 9.6   HGB 14.1   HCT 42.2   MCV 93        Recent Labs   Lab Test 05/09/19  1025   HGB 14.1    No results for input(s): \"TROPONINI\" in the last 10963 hours.  Recent Labs   Lab Test 10/11/22  1508 03/06/20  1550   BNP  --  82   NTBNP 271  --      Recent Labs   Lab Test 03/06/20  1550   TSH 1.55     No results for input(s): \"INR\" in the last 79416 hours.     Chichi Hermosillo PA-C      Thank you for allowing me to participate in the care of your patient.      Sincerely,     Chichi Hermosillo PA-C     [facility]  cc:   Theresa Ortiz MD  [address]  [address]  [place] [address]      "

## 2023-10-25 NOTE — PROGRESS NOTES
HEART CARE ENCOUNTER NOTE      [facility]  [phone]      Assessment/Recommendations   Assessment:   Coronary artery disease: Mild as noted on previous CT calcium scoring in 2015 showing left circumflex score of 23.  Takes aspirin 81 mg daily.  Denies angina.   PVCs: Holter April 2023 showed PVC burden increase from 5 to 15%.  On atenolol 50 mg in the morning and 25 mg in the evening.  Patient notes that PVCs were worse in the summer but the past few weeks they have been much better.  Hypertension: Controlled on atenolol 50 mg in morning and 25 mg daily, losartan 100 mg daily  Hyperlipidemia: Statin and Zetia intolerance.  Not interested in starting Repatha.  Dyspnea on exertion: Likely multifactorial, suspect at least in part due to deconditioning.  Patient does not exercise as she notes she gets short of breath walking short distances such as from a parking lot.  Patient denies angina with exertion.  Discussed work-up including CT coronary angiogram to rule out coronary artery stenosis as a cause of shortness of breath, which patient declined at this time.   Lower extremity edema: Resolved.  Takes furosemide 20 mg daily as needed, last time taking this was July 2023.      Plan:   Sleep medicine study as scheduled November 13.  Continue to monitor shortness of breath.  If this worsens or is accompanied with angina should consider additional testing.  Patient declined any additional work-up at this time.  Continue to monitor PVCs.  Patient notes these have improved the past few weeks.  If worsening could consider increasing atenolol and/or reassessing burden with Holter monitor.  Follow-up with Dr. Ortiz in 6 months, sooner if needed        The level of medical decision making during this visit was of moderate complexity.    Follow up in 6 months with Dr. Ortiz.      History of Present Illness/Subjective    HPI: Viki Robert is a 83 year old female with PMHx of mild coronary artery disease  noted on previous CT calcium score, hypertension, severe dyslipidemia with statin and Zetia intolerance, history of PVCs presents for follow-up.  Patient has had Holter monitors in the past to evaluate the burden of her PVCs.  Last Holter 4/14/2023 showed 15% PVC burden.  Patient was started on atenolol 50 mg in the morning and 25 mg in the evening due to side effects from metoprolol in the past.  Patient has also had evidence of abnormal diastolic function on echocardiogram and was discussed about whether to pursue a coronary angiogram with left and right heart catheterization.  Patient did not want to proceed as she had concerns about a reaction to the dye.     Patient notes that over the summer she was diagnosed with gout.  Since that time she has made some dietary changes and does stop drinking caffeine.  PVCs/palpitations more frequent over the summer but patient notes the past few weeks these have been improved.  Possible that discontinuing caffeine and increasing water intake may be one of the reasons.  Patient notes her lower extremity edema has resolved.  Has furosemide that she takes as needed but has not needed it since July.  Does endorse shortness of breath with exertion which has been present for the past 5 years and gradually is worsening.  She notes she is unable to walk from the parking lot without getting short of breath.  Is interested in handicap parking permit.  Patient denies any angina with exertion.  She denies fatigue, lightheadedness, orthopnea, PND, chest pain, abdominal fullness/bloating, and lower extremity edema.      Echocardiogram 10/4/2022 results:  1. The left ventricle is normal in size. Left ventricular function is  normal.The ejection fraction is 55-60%. There is normal left ventricular wall  thickness. Left ventricular diastolic function is abnormal. No regional wall  motion abnormalities noted.  2. Normal right ventricle size and systolic function.  3. No hemodynamically  "significant valvular abnormalities on 2D or color flow  imaging.    Holter monitor 4/14/2023:  Holter monitoring from 4/14/2023 to 4/15/2023 (duration 24hrs). Predominant underlying rhythm was sinus rhythm, 53 to 95bpm, average 71bpm. No nonsustained or sustained tachyarrhythmias. No atrial fibrillation. There were no pauses of greater than 3 seconds. Rare supraventricular ectopic beats (<1%). Frequent premature ventricular contractions (15%). These were substantially more frequent during daytime hours. Symptom triggers correlated to sinus rhythm with PVCs     Physical Examination  Review of Systems   Vitals: /76 (BP Location: Right arm, Patient Position: Sitting, Cuff Size: Adult Large)   Pulse 85   Resp 16   Ht 1.727 m (5' 8\")   Wt 88.7 kg (195 lb 8 oz)   SpO2 96%   BMI 29.73 kg/m    BMI= Body mass index is 29.73 kg/m .  Wt Readings from Last 3 Encounters:   10/25/23 88.7 kg (195 lb 8 oz)   08/11/23 89.8 kg (198 lb 1 oz)   04/11/23 88.9 kg (196 lb)           ENT/Mouth: membranes moist   EYES:  no scleral icterus, normal conjunctivae                    Neck: No carotid bruit or thyromegaly   Chest/Lungs:   lungs are clear to auscultation, no rales or wheezing, equal chest wall expansion    Cardiovascular:   Regular. Normal first and second heart sounds with no murmurs, rubs, or gallops; the carotid, radial and posterior tibial pulses are intact, Jugular venous pressure normal, no edema bilaterally        Extremities: no cyanosis or clubbing   Skin: no xanthelasma, warm.    Neurologic: no tremors     Psychiatric: alert and oriented x3, calm        Please refer above for cardiac ROS details.        Medical History  Surgical History Family History Social History   History reviewed. No pertinent past medical history.  Past Surgical History:   Procedure Laterality Date    BIOPSY BREAST Left 2000     Family History   Problem Relation Age of Onset    Breast Cancer Mother 90.00    Multiple myeloma Father 75.00 "    Breast Cancer Paternal Grandmother 100.00        Social History     Socioeconomic History    Marital status: Single     Spouse name: Not on file    Number of children: Not on file    Years of education: Not on file    Highest education level: Not on file   Occupational History    Not on file   Tobacco Use    Smoking status: Never    Smokeless tobacco: Never   Substance and Sexual Activity    Alcohol use: Not on file    Drug use: Never    Sexual activity: Not on file   Other Topics Concern    Not on file   Social History Narrative    Not on file     Social Determinants of Health     Financial Resource Strain: Not on file   Food Insecurity: Not on file   Transportation Needs: Not on file   Physical Activity: Not on file   Stress: Not on file   Social Connections: Not on file   Interpersonal Safety: Not on file   Housing Stability: Not on file           Medications  Allergies   Current Outpatient Medications   Medication Sig Dispense Refill    allopurinol (ZYLOPRIM) 100 MG tablet Take 150 mg by mouth daily      aspirin 81 MG EC tablet [ASPIRIN 81 MG EC TABLET] Take 81 mg by mouth daily.      atenolol (TENORMIN) 25 MG tablet TAKE 2 TABLETS IN THE MORNING AND 1 TABLET IN THE EVENING (DOSE INCREASED PER CLL 4/20/23) 270 tablet 0    lansoprazole (PREVACID) 15 MG capsule [LANSOPRAZOLE (PREVACID) 15 MG CAPSULE] Take 15 mg by mouth daily.      losartan (COZAAR) 100 MG tablet TAKE 1 TABLET DAILY 90 tablet 0    multivitamin (ONE A DAY) per tablet Take 1 tablet by mouth daily      zolpidem (AMBIEN) 5 MG tablet Take tablet by mouth 15 minutes prior to sleep, for Sleep Study 1 tablet 0    cholecalciferol, vitamin D3, 1,000 unit (25 mcg) tablet [CHOLECALCIFEROL, VITAMIN D3, 1,000 UNIT (25 MCG) TABLET] Take 1,000 Units by mouth daily. (Patient not taking: Reported on 4/11/2023)      furosemide (LASIX) 20 MG tablet Take 1 tablet (20 mg) by mouth daily as needed (swelling) (Patient not taking: Reported on 10/25/2023) 30 tablet 3     "   Allergies   Allergen Reactions    Atorvastatin Unknown     Achy & weak    Hydrochlorothiazide Unknown     Light headed    Lodine [Etodolac] Unknown    Naprosyn [Naproxen] Unknown     Ankle swelling    Pravachol [Pravastatin] Unknown     Muscle aches & weakness    Statins [Statins] Muscle Pain (Myalgia)          Lab Results    Chemistry/lipid CBC Cardiac Enzymes/BNP/TSH/INR   Recent Labs   Lab Test 09/02/22  1145   CHOL 283*   HDL 37*   *   TRIG 335*     Recent Labs   Lab Test 09/02/22  1145 09/03/21  1547 07/21/20  1404   * 187* 213*     Recent Labs   Lab Test 10/17/23  1045      POTASSIUM 4.1   CHLORIDE 105   CO2 22   *   BUN 16.5   CR 1.32*   GFRESTIMATED 40*   DORIE 9.7     Recent Labs   Lab Test 10/17/23  1045 09/12/23  1413 08/10/23  1633   CR 1.32* 1.30* 1.46*     Recent Labs   Lab Test 05/09/19  1025   A1C 6.1          Recent Labs   Lab Test 05/09/19  1025   WBC 9.6   HGB 14.1   HCT 42.2   MCV 93        Recent Labs   Lab Test 05/09/19  1025   HGB 14.1    No results for input(s): \"TROPONINI\" in the last 14651 hours.  Recent Labs   Lab Test 10/11/22  1508 03/06/20  1550   BNP  --  82   NTBNP 271  --      Recent Labs   Lab Test 03/06/20  1550   TSH 1.55     No results for input(s): \"INR\" in the last 28356 hours.     Chichi Hermosillo PA-C                                       "

## 2023-11-08 ASSESSMENT — SLEEP AND FATIGUE QUESTIONNAIRES
HOW LIKELY ARE YOU TO NOD OFF OR FALL ASLEEP WHILE SITTING AND READING: SLIGHT CHANCE OF DOZING
HOW LIKELY ARE YOU TO NOD OFF OR FALL ASLEEP WHEN YOU ARE A PASSENGER IN A CAR FOR AN HOUR WITHOUT A BREAK: WOULD NEVER DOZE
HOW LIKELY ARE YOU TO NOD OFF OR FALL ASLEEP WHILE WATCHING TV: SLIGHT CHANCE OF DOZING
HOW LIKELY ARE YOU TO NOD OFF OR FALL ASLEEP WHILE SITTING INACTIVE IN A PUBLIC PLACE: WOULD NEVER DOZE
HOW LIKELY ARE YOU TO NOD OFF OR FALL ASLEEP WHILE SITTING QUIETLY AFTER LUNCH WITHOUT ALCOHOL: WOULD NEVER DOZE
HOW LIKELY ARE YOU TO NOD OFF OR FALL ASLEEP WHILE LYING DOWN TO REST IN THE AFTERNOON WHEN CIRCUMSTANCES PERMIT: WOULD NEVER DOZE
HOW LIKELY ARE YOU TO NOD OFF OR FALL ASLEEP IN A CAR, WHILE STOPPED FOR A FEW MINUTES IN TRAFFIC: WOULD NEVER DOZE
HOW LIKELY ARE YOU TO NOD OFF OR FALL ASLEEP WHILE SITTING AND TALKING TO SOMEONE: WOULD NEVER DOZE

## 2023-11-13 ENCOUNTER — THERAPY VISIT (OUTPATIENT)
Dept: SLEEP MEDICINE | Facility: CLINIC | Age: 83
End: 2023-11-13
Payer: COMMERCIAL

## 2023-11-13 DIAGNOSIS — G47.30 SLEEP-RELATED BREATHING DISORDER: ICD-10-CM

## 2023-11-13 PROCEDURE — 95810 POLYSOM 6/> YRS 4/> PARAM: CPT | Performed by: INTERNAL MEDICINE

## 2023-11-21 ENCOUNTER — LAB REQUISITION (OUTPATIENT)
Dept: LAB | Facility: CLINIC | Age: 83
End: 2023-11-21

## 2023-11-21 DIAGNOSIS — I10 ESSENTIAL (PRIMARY) HYPERTENSION: ICD-10-CM

## 2023-11-21 DIAGNOSIS — M10.9 GOUT, UNSPECIFIED: ICD-10-CM

## 2023-11-21 PROCEDURE — 80048 BASIC METABOLIC PNL TOTAL CA: CPT | Performed by: FAMILY MEDICINE

## 2023-11-21 PROCEDURE — 84550 ASSAY OF BLOOD/URIC ACID: CPT | Performed by: FAMILY MEDICINE

## 2023-11-22 LAB
ANION GAP SERPL CALCULATED.3IONS-SCNC: 11 MMOL/L (ref 7–15)
BUN SERPL-MCNC: 18.7 MG/DL (ref 8–23)
CALCIUM SERPL-MCNC: 9.5 MG/DL (ref 8.8–10.2)
CHLORIDE SERPL-SCNC: 109 MMOL/L (ref 98–107)
CREAT SERPL-MCNC: 1.27 MG/DL (ref 0.51–0.95)
DEPRECATED HCO3 PLAS-SCNC: 21 MMOL/L (ref 22–29)
EGFRCR SERPLBLD CKD-EPI 2021: 42 ML/MIN/1.73M2
GLUCOSE SERPL-MCNC: 114 MG/DL (ref 70–99)
POTASSIUM SERPL-SCNC: 4.3 MMOL/L (ref 3.4–5.3)
SLPCOMP: NORMAL
SODIUM SERPL-SCNC: 141 MMOL/L (ref 135–145)
URATE SERPL-MCNC: 5.6 MG/DL (ref 2.4–5.7)

## 2023-12-06 ENCOUNTER — MEDICAL CORRESPONDENCE (OUTPATIENT)
Dept: HEALTH INFORMATION MANAGEMENT | Facility: CLINIC | Age: 83
End: 2023-12-06
Payer: COMMERCIAL

## 2023-12-06 ENCOUNTER — TRANSFERRED RECORDS (OUTPATIENT)
Dept: HEALTH INFORMATION MANAGEMENT | Facility: CLINIC | Age: 83
End: 2023-12-06
Payer: COMMERCIAL

## 2023-12-07 ENCOUNTER — TRANSCRIBE ORDERS (OUTPATIENT)
Dept: OTHER | Age: 83
End: 2023-12-07

## 2023-12-07 DIAGNOSIS — M62.81 MUSCLE WEAKNESS (GENERALIZED): Primary | ICD-10-CM

## 2023-12-12 ASSESSMENT — SLEEP AND FATIGUE QUESTIONNAIRES
HOW LIKELY ARE YOU TO NOD OFF OR FALL ASLEEP WHEN YOU ARE A PASSENGER IN A CAR FOR AN HOUR WITHOUT A BREAK: WOULD NEVER DOZE
HOW LIKELY ARE YOU TO NOD OFF OR FALL ASLEEP WHILE SITTING AND READING: SLIGHT CHANCE OF DOZING
HOW LIKELY ARE YOU TO NOD OFF OR FALL ASLEEP WHILE WATCHING TV: SLIGHT CHANCE OF DOZING
HOW LIKELY ARE YOU TO NOD OFF OR FALL ASLEEP WHILE SITTING INACTIVE IN A PUBLIC PLACE: WOULD NEVER DOZE
HOW LIKELY ARE YOU TO NOD OFF OR FALL ASLEEP IN A CAR, WHILE STOPPED FOR A FEW MINUTES IN TRAFFIC: WOULD NEVER DOZE
HOW LIKELY ARE YOU TO NOD OFF OR FALL ASLEEP WHILE SITTING QUIETLY AFTER LUNCH WITHOUT ALCOHOL: WOULD NEVER DOZE
HOW LIKELY ARE YOU TO NOD OFF OR FALL ASLEEP WHILE SITTING AND TALKING TO SOMEONE: WOULD NEVER DOZE
HOW LIKELY ARE YOU TO NOD OFF OR FALL ASLEEP WHILE LYING DOWN TO REST IN THE AFTERNOON WHEN CIRCUMSTANCES PERMIT: WOULD NEVER DOZE

## 2023-12-19 ENCOUNTER — OFFICE VISIT (OUTPATIENT)
Dept: SLEEP MEDICINE | Facility: CLINIC | Age: 83
End: 2023-12-19
Payer: COMMERCIAL

## 2023-12-19 ENCOUNTER — TELEPHONE (OUTPATIENT)
Dept: CARDIOLOGY | Facility: CLINIC | Age: 83
End: 2023-12-19

## 2023-12-19 VITALS
HEIGHT: 68 IN | OXYGEN SATURATION: 100 % | SYSTOLIC BLOOD PRESSURE: 145 MMHG | HEART RATE: 76 BPM | DIASTOLIC BLOOD PRESSURE: 72 MMHG | WEIGHT: 196.4 LBS | BODY MASS INDEX: 29.77 KG/M2

## 2023-12-19 DIAGNOSIS — G47.33 OSA (OBSTRUCTIVE SLEEP APNEA): ICD-10-CM

## 2023-12-19 DIAGNOSIS — I10 BENIGN ESSENTIAL HYPERTENSION: ICD-10-CM

## 2023-12-19 DIAGNOSIS — G47.36 HYPOXEMIA ASSOCIATED WITH SLEEP: ICD-10-CM

## 2023-12-19 DIAGNOSIS — G47.33 MILD OBSTRUCTIVE SLEEP APNEA: Primary | ICD-10-CM

## 2023-12-19 DIAGNOSIS — I49.3 PVC'S (PREMATURE VENTRICULAR CONTRACTIONS): ICD-10-CM

## 2023-12-19 PROBLEM — M10.9 GOUT: Status: ACTIVE | Noted: 2023-07-15

## 2023-12-19 PROCEDURE — 99213 OFFICE O/P EST LOW 20 MIN: CPT | Performed by: NURSE PRACTITIONER

## 2023-12-19 RX ORDER — LOSARTAN POTASSIUM 100 MG/1
100 TABLET ORAL DAILY
Qty: 90 TABLET | Refills: 1 | Status: SHIPPED | OUTPATIENT
Start: 2023-12-19 | End: 2024-07-16

## 2023-12-19 RX ORDER — ATENOLOL 25 MG/1
TABLET ORAL
Qty: 270 TABLET | Refills: 1 | Status: SHIPPED | OUTPATIENT
Start: 2023-12-19 | End: 2024-06-05 | Stop reason: DRUGHIGH

## 2023-12-19 RX ORDER — LOSARTAN POTASSIUM 100 MG/1
TABLET ORAL
Qty: 90 TABLET | Refills: 3 | OUTPATIENT
Start: 2023-12-19

## 2023-12-19 NOTE — PATIENT INSTRUCTIONS
Drive Safe... Drive Alive     Sleep health profoundly affects your health, mood, and your safety. 33% of the population (one in three of us) is not getting enough sleep and many have a sleep disorder. Not getting enough sleep or having an untreated / undertreated sleep condition may make us sleepy without even knowing it. In fact, our driving could be dramatically impaired due to our sleep health. As your provider, here are some things I would like you to know about driving:     Here are some warning signs for impairment and dangerous drowsy driving:              -Having been awake more than 16 hours               -Looking tired               -Eyelid drooping              -Head nodding (it could be too late at this point)              -Driving for more than 30 minutes     Some things you could do to make the driving safer if you are experiencing some drowsiness:              -Stop driving and rest              -Call for transportation              -Make sure your sleep disorder is adequately treated     Some things that have been shown NOT to work when experiencing drowsiness while driving:              -Turning on the radio              -Opening windows              -Eating any  distracting  /  entertaining  foods (e.g., sunflower seeds, candy, or any other)              -Talking on the phone      Your decision may not only impact your life, but also the life of others. Please, remember to drive safe for yourself and all of us. Your BMI is Body mass index is 29.86 kg/m .    What is BMI?  Body mass index (BMI) is one way to tell whether you are at a healthy weight, overweight, or obese. It measures your weight in relation to your height.  A BMI of 18.5 to 24.9 is in the healthy range. A person with a BMI of 25 to 29.9 is considered overweight, and someone with a BMI of 30 or greater is considered obese.  Another way to find out if you are at risk for health problems caused by overweight and obesity is to measure  your waist. If you are a woman and your waist is more than 35 inches, or if you are a man and your waist is more than 40 inches, your risk of disease may be higher.  More than two-thirds of [place] adults are considered overweight or obese. Being overweight or obese increases the risk for further weight gain.  Excess weight may lead to heart disease and diabetes. Creating and following plans for healthy eating and physical activity may help you improve your health.    Methods for maintaining or losing weight.  Weight control is part of healthy lifestyle and includes exercise, emotional health, and healthy eating habits.  Careful eating habits lifelong is the mainstay of weight control.  Though there are significant health benefits from weight loss, long-term weight loss with diet alone may be very difficult to achieve- studies show long-term success with dietary management in less than 10% of people. Attaining a healthy weight may be especially difficult to achieve in those with severe obesity. In some cases, medications, devices and surgical management might be considered.    What can you do?  If you are overweight or obese and are interested in methods for weight loss, you should discuss this with your provider. In addition, we recommend that you review healthy life styles and methods for weight loss available through the National Institutes of Health patient information sites:   http://win.niddk.nih.gov/publications/index.htm         Your Body mass index is 29.86 kg/m .  Weight management is a personal decision.  If you are interested in exploring weight loss strategies, the following discussion covers the approaches that may be successful. Body mass index (BMI) is one way to tell whether you are at a healthy weight, overweight, or obese. It measures your weight in relation to your height.  A BMI of 18.5 to 24.9 is in the healthy range. A person with a BMI of 25 to 29.9 is considered overweight, and someone with  a BMI of 30 or greater is considered obese. More than two-thirds of American adults are considered overweight or obese.  Being overweight or obese increases the risk for further weight gain. Excess weight may lead to heart disease and diabetes.  Creating and following plans for healthy eating and physical activity may help you improve your health.  Weight control is part of healthy lifestyle and includes exercise, emotional health, and healthy eating habits. Careful eating habits lifelong are the mainstay of weight control. Though there are significant health benefits from weight loss, long-term weight loss with diet alone may be very difficult to achieve- studies show long-term success with dietary management in less than 10% of people. Attaining a healthy weight may be especially difficult to achieve in those with severe obesity. In some cases, medications, devices and surgical management might be considered.  What can you do?  If you are overweight or obese and are interested in methods for weight loss, you should discuss this with your provider.   Consider reducing daily calorie intake by 500 calories.   Keep a food journal.   Avoiding skipping meals, consider cutting portions instead.    Diet combined with exercise helps maintain muscle while optimizing fat loss. Strength training is particularly important for building and maintaining muscle mass. Exercise helps reduce stress, increase energy, and improves fitness. Increasing exercise without diet control, however, may not burn enough calories to loose weight.     Start walking three days a week 10-20 minutes at a time  Work towards walking thirty minutes five days a week   Eventually, increase the speed of your walking for 1-2 minutes at time    In addition, we recommend that you review healthy lifestyles and methods for weight loss available through the National Institutes of Health patient information  sites:  http://win.niddk.nih.gov/publications/index.htm    And look into health and wellness programs that may be available through your health insurance provider, employer, local community center, or [employer].

## 2023-12-19 NOTE — TELEPHONE ENCOUNTER
[facility] Call Center    Phone Message    May a detailed message be left on voicemail: yes     Reason for Call: Medication Refill Request    Has the patient contacted the pharmacy for the refill? Yes   Name of medication being requested: atenolol (TENORMIN) 25 MG tablet   losartan (COZAAR) 100 MG tablet  Provider who prescribed the medication: Theresa Ortiz MD   Pharmacy:   [facility] - [address]     Date medication is needed: 12/28/23     Action Taken: Other: cardio    Travel Screening: Not Applicable    Thank you!  [facility]

## 2023-12-19 NOTE — NURSING NOTE
"Chief Complaint   Patient presents with    Study Results       Initial BP (!) 145/72   Pulse 76   Ht 1.727 m (5' 8\")   Wt 89.1 kg (196 lb 6.4 oz)   SpO2 100%   BMI 29.86 kg/m   Estimated body mass index is 29.86 kg/m  as calculated from the following:    Height as of this encounter: 1.727 m (5' 8\").    Weight as of this encounter: 89.1 kg (196 lb 6.4 oz).    Medication Reconciliation: complete    Neck circumference:    DME:      Stephanie Valle MA  "

## 2023-12-19 NOTE — PROGRESS NOTES
"Chief Complaint   Patient presents with    Study Results       Viki Robert is a 83 year old female who returns to [facility] for review of sleep testing results. Relevant medical history includes (HTN, CAD and Obesity). A diagnostic polysomnogram was performed to evaluate for sleep apnea    Estimated body mass index is 29.86 kg/m  as calculated from the following:    Height as of this encounter: 1.727 m (5' 8\").    Weight as of this encounter: 89.1 kg (196 lb 6.4 oz).  Total score - [place]: 2 (12/12/2023 11:08 AM)  Total Score: 3 (11/8/2023  7:01 PM)    Polysomnography - Test date 11/13/2020.    Sleep latency 57.3 minutes with zolpidem.  REM was achieved with a latency of 255.5 minutes.  Sleep efficiency 68.0%. Total sleep time 305.0 minutes.    Sleep architecture:  Stage 1, 21.1% (5%), stage 2, 66.1% (45-55%), stage 3, 4.6% (15-20%), stage REM, 8.2% (20-25%).  AHI was 5.1, without desaturations. RDI 5.5.  The REM AHI was 26.4 events/hour, the supine AHI was 14.1 events/hour.  Time in REM supine sleep was 0 minutes.           Viki Robert reports that she slept Good .     Results were reviewed in detail today with Viki and a copy given to her for her records.    Reviewed by team:  Tobacco  Allergies  Meds  Problems           Reviewed by provider:   Allergies  Meds  Problems               Problem List:  Patient Active Problem List    Diagnosis Date Noted    Gout 07/15/2023     Priority: Medium    Dyspnea on exertion 06/10/2020     Priority: Medium    PVC's (premature ventricular contractions) 06/10/2020     Priority: Medium    Essential hypertension 03/12/2020     Priority: Medium    Fibrocystic breast disease 03/12/2020     Priority: Medium    Hyperlipidemia 03/12/2020     Priority: Medium        BP (!) 145/72   Pulse 76   Ht 1.727 m (5' 8\")   Wt 89.1 kg (196 lb 6.4 oz)   SpO2 100%   BMI 29.86 kg/m      Impression/Plan:  1.  Mild Obstructive Sleep Apnea With Sleep " Associated Hypoxemia  2.  Significant Periodic Limb Movements In Sleep    Comorbidities:   Hypertension   Dyspnea on exertion   Hyperlipidemia   PVCs   Coronary artery disease   Class I obesity   Fibrocystic breast disease    Assessment  ? The patient s sleep architecture was fragmented with increased arousal index.   ? The patient was found to have mild obstructive sleep apnea with an apnea hypopnea index of 5.1 events per hour with the lowest O2 Sat of 81%.   ? The patient was found to have sleep related hypoxia.   ? The patient s respiratory events were worst in REM supine sleep.   ? Patient was found to have significant periodic limb movement in sleep.   ? Sinus rhythm with PVC s and trigeminy on EKG.     Recommendations:   ? Recommend repeat polysomnography with full night titration of positive airway pressure therapy for the control of sleep disordered breathing.   ? Based on the presence of mild obstructive sleep apnea and excessive daytime sleepiness, treatment could be empirically initiated with Auto?titrating PAP therapy with a range of 5 to 15 cmH2O. Recommend clinical follow up with sleep management team.   Patient will consider, but at this time does not want to commit to it.  Instructed her that at any time during the coming year should she choose to reconsider, in order could be placed for her.  She could reach out to [facility].  ? Patient may be a candidate for dental appliance through referral to Sleep Dentistry for the treatment of obstructive sleep apnea.   ? Cardiology evaluation if clinically indicated.  Patient currently sees the cardiologist.  Will discuss results of the with her cardiologist.  ? Advice regarding the risks of drowsy driving.  Drowsy driving with patient.  Patient states that she does not drive her car much as it is.  She asks to driving to various places, especially if she knows that she may become drowsy..  ? Suggest optimizing sleep schedule and avoiding sleep deprivation.   ?  Weight management (if BMI > 30). ? Pharmacologic therapy should be used for management of restless legs syndrome only if present and clinically indicated and not based on the presence of periodic limb movements alone.     30 minutes spent with patient, all of which were spent face-to-face counseling, consulting, coordinating plan of care.      RANCHO Rojas CNP    CC:  Yecenia Bundy,

## 2024-01-19 ENCOUNTER — TELEPHONE (OUTPATIENT)
Dept: SLEEP MEDICINE | Facility: CLINIC | Age: 84
End: 2024-01-19
Payer: COMMERCIAL

## 2024-01-19 NOTE — TELEPHONE ENCOUNTER
Printed and placed in mail    Yelena JOHN RN  [facility]

## 2024-01-19 NOTE — TELEPHONE ENCOUNTER
Reason for Call:  Other call back    Detailed comments: patient called and would like visit notes and discharged summary from Qiana Chavez CNP at [facility]  December 19 to be mailed to her please.    Cannot print at home.    Thank you.    Phone Number Patient can be reached at: Cell number on file:    Telephone Information:   Mobile [phone]       Best Time: any    Can we leave a detailed message on this number? YES    Call taken on 1/19/2024 at 12:35 PM by Luz Maria Alegre

## 2024-02-15 ENCOUNTER — HOSPITAL ENCOUNTER (OUTPATIENT)
Dept: MAMMOGRAPHY | Facility: CLINIC | Age: 84
Discharge: HOME | End: 2024-02-15
Attending: FAMILY MEDICINE | Admitting: FAMILY MEDICINE
Payer: COMMERCIAL

## 2024-02-15 DIAGNOSIS — Z12.31 VISIT FOR SCREENING MAMMOGRAM: ICD-10-CM

## 2024-02-15 PROCEDURE — 77063 BREAST TOMOSYNTHESIS BI: CPT

## 2024-02-22 ENCOUNTER — HOSPITAL ENCOUNTER (OUTPATIENT)
Dept: MAMMOGRAPHY | Facility: CLINIC | Age: 84
Discharge: HOME | End: 2024-02-22
Attending: FAMILY MEDICINE | Admitting: FAMILY MEDICINE
Payer: COMMERCIAL

## 2024-02-22 DIAGNOSIS — N64.89 BREAST ASYMMETRY: ICD-10-CM

## 2024-02-22 PROCEDURE — 76642 ULTRASOUND BREAST LIMITED: CPT | Mod: RT

## 2024-03-03 ENCOUNTER — HEALTH MAINTENANCE LETTER (OUTPATIENT)
Age: 84
End: 2024-03-03

## 2024-03-20 ENCOUNTER — TRANSFERRED RECORDS (OUTPATIENT)
Dept: HEALTH INFORMATION MANAGEMENT | Facility: CLINIC | Age: 84
End: 2024-03-20

## 2024-03-20 ENCOUNTER — LAB REQUISITION (OUTPATIENT)
Dept: LAB | Facility: CLINIC | Age: 84
End: 2024-03-20

## 2024-03-20 DIAGNOSIS — E55.9 VITAMIN D DEFICIENCY, UNSPECIFIED: ICD-10-CM

## 2024-03-20 DIAGNOSIS — M10.9 GOUT, UNSPECIFIED: ICD-10-CM

## 2024-03-20 DIAGNOSIS — N18.32 CHRONIC KIDNEY DISEASE, STAGE 3B (H): ICD-10-CM

## 2024-03-20 DIAGNOSIS — I12.9 HYPERTENSIVE CHRONIC KIDNEY DISEASE WITH STAGE 1 THROUGH STAGE 4 CHRONIC KIDNEY DISEASE, OR UNSPECIFIED CHRONIC KIDNEY DISEASE: ICD-10-CM

## 2024-03-20 LAB
BASOPHILS # BLD AUTO: 0.1 10E3/UL (ref 0–0.2)
BASOPHILS NFR BLD AUTO: 1 %
EOSINOPHIL # BLD AUTO: 0.2 10E3/UL (ref 0–0.7)
EOSINOPHIL NFR BLD AUTO: 2 %
ERYTHROCYTE [DISTWIDTH] IN BLOOD BY AUTOMATED COUNT: 13.9 % (ref 10–15)
HCT VFR BLD AUTO: 41.6 % (ref 35–47)
HGB BLD-MCNC: 14.1 G/DL (ref 11.7–15.7)
IMM GRANULOCYTES # BLD: 0 10E3/UL
IMM GRANULOCYTES NFR BLD: 0 %
LYMPHOCYTES # BLD AUTO: 2 10E3/UL (ref 0.8–5.3)
LYMPHOCYTES NFR BLD AUTO: 26 %
MCH RBC QN AUTO: 31.5 PG (ref 26.5–33)
MCHC RBC AUTO-ENTMCNC: 33.9 G/DL (ref 31.5–36.5)
MCV RBC AUTO: 93 FL (ref 78–100)
MONOCYTES # BLD AUTO: 0.7 10E3/UL (ref 0–1.3)
MONOCYTES NFR BLD AUTO: 9 %
NEUTROPHILS # BLD AUTO: 4.8 10E3/UL (ref 1.6–8.3)
NEUTROPHILS NFR BLD AUTO: 62 %
NRBC # BLD AUTO: 0 10E3/UL
NRBC BLD AUTO-RTO: 0 /100
PLATELET # BLD AUTO: 234 10E3/UL (ref 150–450)
RBC # BLD AUTO: 4.48 10E6/UL (ref 3.8–5.2)
WBC # BLD AUTO: 7.7 10E3/UL (ref 4–11)

## 2024-03-20 PROCEDURE — 80061 LIPID PANEL: CPT | Performed by: FAMILY MEDICINE

## 2024-03-20 PROCEDURE — 85025 COMPLETE CBC W/AUTO DIFF WBC: CPT | Performed by: FAMILY MEDICINE

## 2024-03-20 PROCEDURE — 82306 VITAMIN D 25 HYDROXY: CPT | Performed by: FAMILY MEDICINE

## 2024-03-20 PROCEDURE — 84550 ASSAY OF BLOOD/URIC ACID: CPT | Performed by: FAMILY MEDICINE

## 2024-03-20 PROCEDURE — 84443 ASSAY THYROID STIM HORMONE: CPT | Performed by: FAMILY MEDICINE

## 2024-03-20 PROCEDURE — 80053 COMPREHEN METABOLIC PANEL: CPT | Performed by: FAMILY MEDICINE

## 2024-03-20 PROCEDURE — 82607 VITAMIN B-12: CPT | Performed by: FAMILY MEDICINE

## 2024-03-21 LAB
ALBUMIN SERPL BCG-MCNC: 4.4 G/DL (ref 3.5–5.2)
ALP SERPL-CCNC: 89 U/L (ref 40–150)
ALT SERPL W P-5'-P-CCNC: 27 U/L (ref 0–50)
ANION GAP SERPL CALCULATED.3IONS-SCNC: 15 MMOL/L (ref 7–15)
AST SERPL W P-5'-P-CCNC: 26 U/L (ref 0–45)
BILIRUB SERPL-MCNC: 0.6 MG/DL
BUN SERPL-MCNC: 17.6 MG/DL (ref 8–23)
CALCIUM SERPL-MCNC: 9.9 MG/DL (ref 8.8–10.2)
CHLORIDE SERPL-SCNC: 104 MMOL/L (ref 98–107)
CHOLEST SERPL-MCNC: 279 MG/DL
CREAT SERPL-MCNC: 1.27 MG/DL (ref 0.51–0.95)
DEPRECATED HCO3 PLAS-SCNC: 20 MMOL/L (ref 22–29)
EGFRCR SERPLBLD CKD-EPI 2021: 42 ML/MIN/1.73M2
FASTING STATUS PATIENT QL REPORTED: ABNORMAL
GLUCOSE SERPL-MCNC: 118 MG/DL (ref 70–99)
HDLC SERPL-MCNC: 35 MG/DL
LDLC SERPL CALC-MCNC: 165 MG/DL
NONHDLC SERPL-MCNC: 244 MG/DL
POTASSIUM SERPL-SCNC: 4.3 MMOL/L (ref 3.4–5.3)
PROT SERPL-MCNC: 7.1 G/DL (ref 6.4–8.3)
SODIUM SERPL-SCNC: 139 MMOL/L (ref 135–145)
TRIGL SERPL-MCNC: 394 MG/DL
TSH SERPL DL<=0.005 MIU/L-ACNC: 2.02 UIU/ML (ref 0.3–4.2)
URATE SERPL-MCNC: 5.6 MG/DL (ref 2.4–5.7)
VIT B12 SERPL-MCNC: 780 PG/ML (ref 232–1245)
VIT D+METAB SERPL-MCNC: 51 NG/ML (ref 20–50)

## 2024-03-26 PROBLEM — R06.09 DYSPNEA ON EXERTION: Status: RESOLVED | Noted: 2020-06-10 | Resolved: 2024-03-26

## 2024-03-26 PROBLEM — G60.8 POLYNEUROPATHY, PERIPHERAL SENSORIMOTOR AXONAL: Status: ACTIVE | Noted: 2024-03-26

## 2024-03-26 PROBLEM — N18.32 CHRONIC KIDNEY DISEASE, STAGE 3B (H): Status: ACTIVE | Noted: 2024-03-26

## 2024-03-26 PROBLEM — M48.02 CERVICAL STENOSIS OF SPINAL CANAL: Status: ACTIVE | Noted: 2024-03-26

## 2024-03-26 NOTE — PROGRESS NOTES
NEUROLOGY CONSULTATION NOTE       [facility] NEUROLOGY [place]  [address] [place] [address]  Tel: [phone]  Fax: [phone]  [email or web address]     Viki Robert,  1940, MRN [id number]  PCP: Yecenia Bundy  Date: 2024     ASSESSMENT & PLAN     Visit Diagnosis  Cervical stenosis of spinal canal  Polyneuropathy, peripheral sensorimotor axonal  Generalized muscle weakness     Generalized muscle weakness; H/O sensorimotor polyneuropathy, cervical spine stenosis & statin myopathy  83-year-old female with CKD stage III, vitamin D deficiency, HLD, IFG, HTN and gout who was evaluated in 2015 for generalized weakness and found to have peripheral neuropathy and cervical spine stenosis who returns for follow-up with worsening symptoms.  She has started using a walker but her exam appears stable except for reflexes being much more brisk and sensory findings have ascended into her legs.  I suspect her balance issues are due to peripheral neuropathy and cervical spine stenosis.  She has history of statin myopathy but has been off statin for multiple years.  I have recommended:    1.  Lab work for myopathy and neuropathy to include RASTA, CK, folate, GM1 antibody, HCV antibody with reflex to HCV RNA, hemoglobin A1c, lactate, pyruvate, Lyme titer, paraneoplastic panel, SPEP, immunofixation, B1, B6, vitamin E and zinc  2.  EMG of bilateral lower extremity and compare with EMG done in 2015  3.  MRI cervical spine  4.  Follow-up the day she is scheduled for EMG    Thank you again for this referral, please feel free to contact me if you have any questions.    Rigoberto Lund MD  [facility]     REASON FOR CONSULTATION muscle weakness legs        HISTORY OF PRESENT ILLNESS     We have been requested by Dr. Boland to evaluate Viki Robert who is a 83 year old  female for generalized muscle weakness    Patient is 83-year-old female with history of CKD stage  III, vitamin D deficiency, HLD, impaired fasting glucose, HTN and gout who was referred for evaluation of generalized muscle weakness.  Patient was previously seen in our clinic in 2015 when she had reported that she had poor stamina for a long time but in 2015 had noticed a marked decrease in her strength with weakness and inability to stand up from a sitting position and shortness of breath along with decreased balance and leg pain.  She preferred her legs dangling off the bed and inability to stand for even a short period of time.  She had a evaluation by cardiology at that time and her EKG, FAITH and cardiac exam were normal.  She also had PFTs done that were normal.  Prior to that presentation in 2010 she was evaluated in our clinic also for loss of taste and smell and had MRI of the brain that was unremarkable.  Although she has history of statin myopathy she had been off statin for multiple years.  After her last visit in 2015 she had MRI of cervical spine that showed moderate C3-C4 through C6/C7 stenosis and multilevel neuroforaminal stenosis and lumbar spine showed moderate degenerative changes with neuroforaminal stenosis.  EMG was also done that suggested early mixed sensorimotor polyneuropathy and there were findings to suggest early myopathy.  Since her last visit she has noticed steady decline in her ability to walk.  She fell down few times and started using a walker.  She denies any skin rash, dysphagia, dysarthria or diplopia.     PROBLEM LIST   Patient Active Problem List   Diagnosis Code    Essential hypertension I10    Fibrocystic breast disease N60.19    Hyperlipidemia E78.5    PVC's (premature ventricular contractions) I49.3    Gout M10.9    Moderate C3-C4 to C6-C7 stenosis M48.02    Chronic kidney disease, stage 3b (H) N18.32    Polyneuropathy, peripheral sensorimotor axonal G60.8         PAST MEDICAL & SURGICAL HISTORY     Past Medical History:   Patient  has no past medical history on  "file.    Surgical History:  She  has a past surgical history that includes Biopsy breast (Left, 2000).     SOCIAL HISTORY     Reviewed, and she  reports that she has never smoked. She has never used smokeless tobacco. She reports that she does not use drugs.     FAMILY HISTORY     Reviewed, and family history includes Breast Cancer (age of onset: 100.00) in her paternal grandmother; Breast Cancer (age of onset: 90.00) in her mother; Multiple myeloma (age of onset: 75.00) in her father.     ALLERGIES     Allergies   Allergen Reactions    Atorvastatin Unknown     Achy & weak    Hydrochlorothiazide Unknown     Light headed    Lodine [Etodolac] Unknown    Naprosyn [Naproxen] Unknown     Ankle swelling    Pravachol [Pravastatin] Unknown     Muscle aches & weakness    Statins [Statins] Muscle Pain (Myalgia)         REVIEW OF SYSTEMS     A 12 point review of system was performed and was negative except as outlined in the history of present illness.     HOME MEDICATIONS     Current Outpatient Rx   Medication Sig Dispense Refill    allopurinol (ZYLOPRIM) 100 MG tablet Take 150 mg by mouth daily      aspirin 81 MG EC tablet [ASPIRIN 81 MG EC TABLET] Take 81 mg by mouth daily.      atenolol (TENORMIN) 25 MG tablet TAKE 2 TABLETS IN THE MORNING AND 1 TABLET IN THE EVENING (DOSE INCREASED PER CLL 4/20/23) (Patient taking differently: 50 mg 2 times daily 50 mg BID) 270 tablet 1    lansoprazole (PREVACID) 15 MG capsule [LANSOPRAZOLE (PREVACID) 15 MG CAPSULE] Take 15 mg by mouth daily.      losartan (COZAAR) 100 MG tablet Take 1 tablet (100 mg) by mouth daily 90 tablet 1    multivitamin (ONE A DAY) per tablet Take 1 tablet by mouth daily           PHYSICAL EXAM     Vital signs  BP (!) 144/80   Pulse 71   Ht 1.727 m (5' 8\")   Wt 88.5 kg (195 lb)   BMI 29.65 kg/m      Weight:   195 lbs 0 oz    Pleasant elderly female who is alert and oriented vital signs are reviewed and documented in electronic medical record.  Neck supple.  " Neurologically speech was normal.  Cranial nerves II through XII are intact motor strength 5/5 except proximally in the lower extremity 4+/5 reflexes are 3+ questionable upgoing toe on the right equivocal toe on the left.  Sensation decreased to light touch pinprick vibratory sensation below knees bilaterally.  She walks with a walker.  No dysmetria noted on finger-nose testing.     PERTINENT DIAGNOSTIC STUDIES     Following studies were reviewed:     MRI CERVICAL SPINE 12/13/2015  1.  Moderate multilevel degenerative changes in the cervical spine, as detailed.    2.  C3-C4 through C6-C7 mild to moderate central canal stenosis.    3.  Mild multilevel foraminal narrowing, as detailed. C6-C7 mild to moderate foraminal narrowing.    4.  Equivocal tiny focus of T2 signal hyperintensity in the left aspect of the cord at the C5 vertebral body level, nonspecific.    MRI LUMBAR SPINE 12/13/2015  1.  Mild to moderate degenerative changes in the lumbar spine, as detailed.    2.  L4-L5 disc osteophyte complex and moderate to severe facet arthropathy. Mild to moderate foraminal narrowing. Central canal remains adequately patent.    3.  L5-S1 disc osteophyte complex with moderate left and mild right foraminal narrowing. Contact of the exiting left L5 nerve root. Central canal remains adequately patent.    MRI BRAIN 7/13/2010  1.  There are few small scattered foci of nonspecific T2/FLAIR hyperintensity in the cerebral white matter which are nonspecific, but could represent chronic small vessel ischemic change.  2.  The head MRI otherwise is normal.    EMG 2/18/2015  This is an abnormal nerve conduction and EMG study of lower extremity that suggests early sensory motor predominantly axonal polyneuropathy.  In addition, there is evidence of possible early myopathy, clinical correlation is recommended.    ECHOCARDIOGRAM 10/4/2022  1. The left ventricle is normal in size. Left ventricular function is  normal.The ejection fraction is  55-60%. There is normal left ventricular wall  thickness. Left ventricular diastolic function is abnormal. No regional wall  motion abnormalities noted.  2. Normal right ventricle size and systolic function.  3. No hemodynamically significant valvular abnormalities on 2D or color flow  imaging.    HOLTER MONITOR 4/14/2023       PERTINENT LABS  Following labs were reviewed:  Lab Requisition on 03/20/2024   Component Date Value Ref Range Status    Sodium 03/20/2024 139  135 - 145 mmol/L Final    Potassium 03/20/2024 4.3  3.4 - 5.3 mmol/L Final    Carbon Dioxide (CO2) 03/20/2024 20 (L)  22 - 29 mmol/L Final    Anion Gap 03/20/2024 15  7 - 15 mmol/L Final    Urea Nitrogen 03/20/2024 17.6  8.0 - 23.0 mg/dL Final    Creatinine 03/20/2024 1.27 (H)  0.51 - 0.95 mg/dL Final    GFR Estimate 03/20/2024 42 (L)  >60 mL/min/1.73m2 Final    Calcium 03/20/2024 9.9  8.8 - 10.2 mg/dL Final    Chloride 03/20/2024 104  98 - 107 mmol/L Final    Glucose 03/20/2024 118 (H)  70 - 99 mg/dL Final    Alkaline Phosphatase 03/20/2024 89  40 - 150 U/L Final    AST 03/20/2024 26  0 - 45 U/L Final    ALT 03/20/2024 27  0 - 50 U/L Final    Protein Total 03/20/2024 7.1  6.4 - 8.3 g/dL Final    Albumin 03/20/2024 4.4  3.5 - 5.2 g/dL Final    Bilirubin Total 03/20/2024 0.6  <=1.2 mg/dL Final    Vitamin D, Total (25-Hydroxy) 03/20/2024 51 (H)  20 - 50 ng/mL Final    TSH 03/20/2024 2.02  0.30 - 4.20 uIU/mL Final    Uric Acid 03/20/2024 5.6  2.4 - 5.7 mg/dL Final    Vitamin B12 03/20/2024 780  232 - 1,245 pg/mL Final    Cholesterol 03/20/2024 279 (H)  <200 mg/dL Final    Triglycerides 03/20/2024 394 (H)  <150 mg/dL Final    Direct Measure HDL 03/20/2024 35 (L)  >=50 mg/dL Final    LDL Cholesterol Calculated 03/20/2024 165 (H)  <=100 mg/dL Final    Non HDL Cholesterol 03/20/2024 244 (H)  <130 mg/dL Final    Patient Fasting > 8hrs? 03/20/2024 Unknown   Final    WBC Count 03/20/2024 7.7  4.0 - 11.0 10e3/uL Final    RBC Count 03/20/2024 4.48  3.80 - 5.20  10e6/uL Final    Hemoglobin 03/20/2024 14.1  11.7 - 15.7 g/dL Final    Hematocrit 03/20/2024 41.6  35.0 - 47.0 % Final    MCV 03/20/2024 93  78 - 100 fL Final    MCH 03/20/2024 31.5  26.5 - 33.0 pg Final    MCHC 03/20/2024 33.9  31.5 - 36.5 g/dL Final    RDW 03/20/2024 13.9  10.0 - 15.0 % Final    Platelet Count 03/20/2024 234  150 - 450 10e3/uL Final    % Neutrophils 03/20/2024 62  % Final    % Lymphocytes 03/20/2024 26  % Final    % Monocytes 03/20/2024 9  % Final    % Eosinophils 03/20/2024 2  % Final    % Basophils 03/20/2024 1  % Final    % Immature Granulocytes 03/20/2024 0  % Final    NRBCs per 100 WBC 03/20/2024 0  <1 /100 Final    Absolute Neutrophils 03/20/2024 4.8  1.6 - 8.3 10e3/uL Final    Absolute Lymphocytes 03/20/2024 2.0  0.8 - 5.3 10e3/uL Final    Absolute Monocytes 03/20/2024 0.7  0.0 - 1.3 10e3/uL Final    Absolute Eosinophils 03/20/2024 0.2  0.0 - 0.7 10e3/uL Final    Absolute Basophils 03/20/2024 0.1  0.0 - 0.2 10e3/uL Final    Absolute Immature Granulocytes 03/20/2024 0.0  <=0.4 10e3/uL Final    Absolute NRBCs 03/20/2024 0.0  10e3/uL Final        Total time spent for face to face visit, reviewing labs/imaging studies, counseling and coordination of care was: 1 Hour spent on the date of the encounter doing chart review, review of outside records, review of test results, interpretation of tests, patient visit, and documentation     This note was dictated using voice recognition software.  Any grammatical or context distortions are unintentional and inherent to the software.    Orders Placed This Encounter   Procedures    MR Cervical Spine w/o Contrast    CK total    Lactic acid whole blood    Pyruvic acid    Anti Nuclear Marylin IgG by IFA with Reflex    Folate    GM1 antibody panel    HCV Antibody w/Reflex to HCV RNA    Hemoglobin A1c    Lyme Disease Total Abs Bld with Reflex to Confirm CLIA    Methylmalonic Acid    Paraneoplastic Antibodies with Reflex    Protein Immunofixation Serum    Vitamin B1  whole blood    Vitamin B6    Vitamin E    Zinc    EMG    Protein electrophoresis      New Prescriptions    No medications on file      Modified Medications    No medications on file

## 2024-03-27 ENCOUNTER — OFFICE VISIT (OUTPATIENT)
Dept: NEUROLOGY | Facility: CLINIC | Age: 84
End: 2024-03-27
Payer: COMMERCIAL

## 2024-03-27 VITALS
BODY MASS INDEX: 29.55 KG/M2 | HEIGHT: 68 IN | SYSTOLIC BLOOD PRESSURE: 144 MMHG | DIASTOLIC BLOOD PRESSURE: 80 MMHG | WEIGHT: 195 LBS | HEART RATE: 71 BPM

## 2024-03-27 DIAGNOSIS — R73.03 PREDIABETES: ICD-10-CM

## 2024-03-27 DIAGNOSIS — K90.9 INTESTINAL MALABSORPTION, UNSPECIFIED TYPE: ICD-10-CM

## 2024-03-27 DIAGNOSIS — M62.81 MUSCLE WEAKNESS (GENERALIZED): ICD-10-CM

## 2024-03-27 DIAGNOSIS — G60.8 POLYNEUROPATHY, PERIPHERAL SENSORIMOTOR AXONAL: ICD-10-CM

## 2024-03-27 DIAGNOSIS — M48.02 CERVICAL STENOSIS OF SPINAL CANAL: Primary | ICD-10-CM

## 2024-03-27 PROCEDURE — G2211 COMPLEX E/M VISIT ADD ON: HCPCS | Performed by: PSYCHIATRY & NEUROLOGY

## 2024-03-27 PROCEDURE — 99205 OFFICE O/P NEW HI 60 MIN: CPT | Performed by: PSYCHIATRY & NEUROLOGY

## 2024-03-27 NOTE — LETTER
3/27/2024         RE: Viki Robert  [address] [place] [address]        Dear Colleague,    Thank you for referring your patient, Viki Robert, to the [facility] [place]. Please see a copy of my visit note below.    NEUROLOGY CONSULTATION NOTE       [facility] [place]  [address] [place] [address]  Tel: [phone]  Fax: [phone]  [email or web address]     Viki Robert,  1940, MRN [id number]  PCP: Yecenia Bundy  Date: 2024     ASSESSMENT & PLAN     Visit Diagnosis  Cervical stenosis of spinal canal  Polyneuropathy, peripheral sensorimotor axonal  Generalized muscle weakness     Generalized muscle weakness; H/O sensorimotor polyneuropathy, cervical spine stenosis & statin myopathy  83-year-old female with CKD stage III, vitamin D deficiency, HLD, IFG, HTN and gout who was evaluated in  for generalized weakness and found to have peripheral neuropathy and cervical spine stenosis who returns for follow-up with worsening symptoms.  She has started using a walker but her exam appears stable except for reflexes being much more brisk and sensory findings have ascended into her legs.  I suspect her balance issues are due to peripheral neuropathy and cervical spine stenosis.  She has history of statin myopathy but has been off statin for multiple years.  I have recommended:    1.  Lab work for myopathy and neuropathy to include RASTA, CK, folate, GM1 antibody, HCV antibody with reflex to HCV RNA, hemoglobin A1c, lactate, pyruvate, Lyme titer, paraneoplastic panel, SPEP, immunofixation, B1, B6, vitamin E and zinc  2.  EMG of bilateral lower extremity and compare with EMG done in 2015  3.  MRI cervical spine  4.  Follow-up the day she is scheduled for EMG    Thank you again for this referral, please feel free to contact me if you have any questions.    Rigoberto Lund MD  [facility]     REASON FOR  CONSULTATION muscle weakness legs        HISTORY OF PRESENT ILLNESS     We have been requested by Dr. Boland to evaluate Viki Robert who is a 83 year old  female for generalized muscle weakness    Patient is 83-year-old female with history of CKD stage III, vitamin D deficiency, HLD, impaired fasting glucose, HTN and gout who was referred for evaluation of generalized muscle weakness.  Patient was previously seen in our clinic in 2015 when she had reported that she had poor stamina for a long time but in 2015 had noticed a marked decrease in her strength with weakness and inability to stand up from a sitting position and shortness of breath along with decreased balance and leg pain.  She preferred her legs dangling off the bed and inability to stand for even a short period of time.  She had a evaluation by cardiology at that time and her EKG, FAITH and cardiac exam were normal.  She also had PFTs done that were normal.  Prior to that presentation in 2010 she was evaluated in our clinic also for loss of taste and smell and had MRI of the brain that was unremarkable.  Although she has history of statin myopathy she had been off statin for multiple years.  After her last visit in 2015 she had MRI of cervical spine that showed moderate C3-C4 through C6/C7 stenosis and multilevel neuroforaminal stenosis and lumbar spine showed moderate degenerative changes with neuroforaminal stenosis.  EMG was also done that suggested early mixed sensorimotor polyneuropathy and there were findings to suggest early myopathy.  Since her last visit she has noticed steady decline in her ability to walk.  She fell down few times and started using a walker.  She denies any skin rash, dysphagia, dysarthria or diplopia.     PROBLEM LIST   Patient Active Problem List   Diagnosis Code     Essential hypertension I10     Fibrocystic breast disease N60.19     Hyperlipidemia E78.5     PVC's (premature ventricular contractions) I49.3     Gout M10.9      Moderate C3-C4 to C6-C7 stenosis M48.02     Chronic kidney disease, stage 3b (H) N18.32     Polyneuropathy, peripheral sensorimotor axonal G60.8         PAST MEDICAL & SURGICAL HISTORY     Past Medical History:   Patient  has no past medical history on file.    Surgical History:  She  has a past surgical history that includes Biopsy breast (Left, 2000).     SOCIAL HISTORY     Reviewed, and she  reports that she has never smoked. She has never used smokeless tobacco. She reports that she does not use drugs.     FAMILY HISTORY     Reviewed, and family history includes Breast Cancer (age of onset: 100.00) in her paternal grandmother; Breast Cancer (age of onset: 90.00) in her mother; Multiple myeloma (age of onset: 75.00) in her father.     ALLERGIES     Allergies   Allergen Reactions     Atorvastatin Unknown     Achy & weak     Hydrochlorothiazide Unknown     Light headed     Lodine [Etodolac] Unknown     Naprosyn [Naproxen] Unknown     Ankle swelling     Pravachol [Pravastatin] Unknown     Muscle aches & weakness     Statins [Statins] Muscle Pain (Myalgia)         REVIEW OF SYSTEMS     A 12 point review of system was performed and was negative except as outlined in the history of present illness.     HOME MEDICATIONS     Current Outpatient Rx   Medication Sig Dispense Refill     allopurinol (ZYLOPRIM) 100 MG tablet Take 150 mg by mouth daily       aspirin 81 MG EC tablet [ASPIRIN 81 MG EC TABLET] Take 81 mg by mouth daily.       atenolol (TENORMIN) 25 MG tablet TAKE 2 TABLETS IN THE MORNING AND 1 TABLET IN THE EVENING (DOSE INCREASED PER CLL 4/20/23) (Patient taking differently: 50 mg 2 times daily 50 mg BID) 270 tablet 1     lansoprazole (PREVACID) 15 MG capsule [LANSOPRAZOLE (PREVACID) 15 MG CAPSULE] Take 15 mg by mouth daily.       losartan (COZAAR) 100 MG tablet Take 1 tablet (100 mg) by mouth daily 90 tablet 1     multivitamin (ONE A DAY) per tablet Take 1 tablet by mouth daily           PHYSICAL EXAM     Vital  "signs  BP (!) 144/80   Pulse 71   Ht 1.727 m (5' 8\")   Wt 88.5 kg (195 lb)   BMI 29.65 kg/m      Weight:   195 lbs 0 oz    Pleasant elderly female who is alert and oriented vital signs are reviewed and documented in electronic medical record.  Neck supple.  Neurologically speech was normal.  Cranial nerves II through XII are intact motor strength 5/5 except proximally in the lower extremity 4+/5 reflexes are 3+ questionable upgoing toe on the right equivocal toe on the left.  Sensation decreased to light touch pinprick vibratory sensation below knees bilaterally.  She walks with a walker.  No dysmetria noted on finger-nose testing.     PERTINENT DIAGNOSTIC STUDIES     Following studies were reviewed:     MRI CERVICAL SPINE 12/13/2015  1.  Moderate multilevel degenerative changes in the cervical spine, as detailed.    2.  C3-C4 through C6-C7 mild to moderate central canal stenosis.    3.  Mild multilevel foraminal narrowing, as detailed. C6-C7 mild to moderate foraminal narrowing.    4.  Equivocal tiny focus of T2 signal hyperintensity in the left aspect of the cord at the C5 vertebral body level, nonspecific.    MRI LUMBAR SPINE 12/13/2015  1.  Mild to moderate degenerative changes in the lumbar spine, as detailed.    2.  L4-L5 disc osteophyte complex and moderate to severe facet arthropathy. Mild to moderate foraminal narrowing. Central canal remains adequately patent.    3.  L5-S1 disc osteophyte complex with moderate left and mild right foraminal narrowing. Contact of the exiting left L5 nerve root. Central canal remains adequately patent.    MRI BRAIN 7/13/2010  1.  There are few small scattered foci of nonspecific T2/FLAIR hyperintensity in the cerebral white matter which are nonspecific, but could represent chronic small vessel ischemic change.  2.  The head MRI otherwise is normal.    EMG 2/18/2015  This is an abnormal nerve conduction and EMG study of lower extremity that suggests early sensory motor " predominantly axonal polyneuropathy.  In addition, there is evidence of possible early myopathy, clinical correlation is recommended.    ECHOCARDIOGRAM 10/4/2022  1. The left ventricle is normal in size. Left ventricular function is  normal.The ejection fraction is 55-60%. There is normal left ventricular wall  thickness. Left ventricular diastolic function is abnormal. No regional wall  motion abnormalities noted.  2. Normal right ventricle size and systolic function.  3. No hemodynamically significant valvular abnormalities on 2D or color flow  imaging.    HOLTER MONITOR 4/14/2023       PERTINENT LABS  Following labs were reviewed:  Lab Requisition on 03/20/2024   Component Date Value Ref Range Status     Sodium 03/20/2024 139  135 - 145 mmol/L Final     Potassium 03/20/2024 4.3  3.4 - 5.3 mmol/L Final     Carbon Dioxide (CO2) 03/20/2024 20 (L)  22 - 29 mmol/L Final     Anion Gap 03/20/2024 15  7 - 15 mmol/L Final     Urea Nitrogen 03/20/2024 17.6  8.0 - 23.0 mg/dL Final     Creatinine 03/20/2024 1.27 (H)  0.51 - 0.95 mg/dL Final     GFR Estimate 03/20/2024 42 (L)  >60 mL/min/1.73m2 Final     Calcium 03/20/2024 9.9  8.8 - 10.2 mg/dL Final     Chloride 03/20/2024 104  98 - 107 mmol/L Final     Glucose 03/20/2024 118 (H)  70 - 99 mg/dL Final     Alkaline Phosphatase 03/20/2024 89  40 - 150 U/L Final     AST 03/20/2024 26  0 - 45 U/L Final     ALT 03/20/2024 27  0 - 50 U/L Final     Protein Total 03/20/2024 7.1  6.4 - 8.3 g/dL Final     Albumin 03/20/2024 4.4  3.5 - 5.2 g/dL Final     Bilirubin Total 03/20/2024 0.6  <=1.2 mg/dL Final     Vitamin D, Total (25-Hydroxy) 03/20/2024 51 (H)  20 - 50 ng/mL Final     TSH 03/20/2024 2.02  0.30 - 4.20 uIU/mL Final     Uric Acid 03/20/2024 5.6  2.4 - 5.7 mg/dL Final     Vitamin B12 03/20/2024 780  232 - 1,245 pg/mL Final     Cholesterol 03/20/2024 279 (H)  <200 mg/dL Final     Triglycerides 03/20/2024 394 (H)  <150 mg/dL Final     Direct Measure HDL 03/20/2024 35 (L)  >=50  mg/dL Final     LDL Cholesterol Calculated 03/20/2024 165 (H)  <=100 mg/dL Final     Non HDL Cholesterol 03/20/2024 244 (H)  <130 mg/dL Final     Patient Fasting > 8hrs? 03/20/2024 Unknown   Final     WBC Count 03/20/2024 7.7  4.0 - 11.0 10e3/uL Final     RBC Count 03/20/2024 4.48  3.80 - 5.20 10e6/uL Final     Hemoglobin 03/20/2024 14.1  11.7 - 15.7 g/dL Final     Hematocrit 03/20/2024 41.6  35.0 - 47.0 % Final     MCV 03/20/2024 93  78 - 100 fL Final     MCH 03/20/2024 31.5  26.5 - 33.0 pg Final     MCHC 03/20/2024 33.9  31.5 - 36.5 g/dL Final     RDW 03/20/2024 13.9  10.0 - 15.0 % Final     Platelet Count 03/20/2024 234  150 - 450 10e3/uL Final     % Neutrophils 03/20/2024 62  % Final     % Lymphocytes 03/20/2024 26  % Final     % Monocytes 03/20/2024 9  % Final     % Eosinophils 03/20/2024 2  % Final     % Basophils 03/20/2024 1  % Final     % Immature Granulocytes 03/20/2024 0  % Final     NRBCs per 100 WBC 03/20/2024 0  <1 /100 Final     Absolute Neutrophils 03/20/2024 4.8  1.6 - 8.3 10e3/uL Final     Absolute Lymphocytes 03/20/2024 2.0  0.8 - 5.3 10e3/uL Final     Absolute Monocytes 03/20/2024 0.7  0.0 - 1.3 10e3/uL Final     Absolute Eosinophils 03/20/2024 0.2  0.0 - 0.7 10e3/uL Final     Absolute Basophils 03/20/2024 0.1  0.0 - 0.2 10e3/uL Final     Absolute Immature Granulocytes 03/20/2024 0.0  <=0.4 10e3/uL Final     Absolute NRBCs 03/20/2024 0.0  10e3/uL Final        Total time spent for face to face visit, reviewing labs/imaging studies, counseling and coordination of care was: 1 Hour spent on the date of the encounter doing chart review, review of outside records, review of test results, interpretation of tests, patient visit, and documentation     This note was dictated using voice recognition software.  Any grammatical or context distortions are unintentional and inherent to the software.    Orders Placed This Encounter   Procedures     MR Cervical Spine w/o Contrast     CK total     Lactic acid  whole blood     Pyruvic acid     Anti Nuclear Marylin IgG by IFA with Reflex     Folate     GM1 antibody panel     HCV Antibody w/Reflex to HCV RNA     Hemoglobin A1c     Lyme Disease Total Abs Bld with Reflex to Confirm CLIA     Methylmalonic Acid     Paraneoplastic Antibodies with Reflex     Protein Immunofixation Serum     Vitamin B1 whole blood     Vitamin B6     Vitamin E     Zinc     EMG     Protein electrophoresis      New Prescriptions    No medications on file      Modified Medications    No medications on file                Again, thank you for allowing me to participate in the care of your patient.        Sincerely,        Rigoberto Lund MD

## 2024-03-27 NOTE — NURSING NOTE
Chief Complaint   Patient presents with    muscle weakness legs     Patient reports muscle weakness in both legs. She also has balance concerns and SOB. New patient.     Spring Carter on 3/27/2024 at 9:57 AM

## 2024-03-28 ENCOUNTER — LAB (OUTPATIENT)
Dept: LAB | Facility: CLINIC | Age: 84
End: 2024-03-28
Payer: COMMERCIAL

## 2024-03-28 ENCOUNTER — TELEPHONE (OUTPATIENT)
Dept: NEUROLOGY | Facility: CLINIC | Age: 84
End: 2024-03-28

## 2024-03-28 DIAGNOSIS — G60.8 POLYNEUROPATHY, PERIPHERAL SENSORIMOTOR AXONAL: ICD-10-CM

## 2024-03-28 DIAGNOSIS — R73.03 PREDIABETES: ICD-10-CM

## 2024-03-28 DIAGNOSIS — M62.81 MUSCLE WEAKNESS (GENERALIZED): ICD-10-CM

## 2024-03-28 DIAGNOSIS — K90.9 INTESTINAL MALABSORPTION, UNSPECIFIED TYPE: ICD-10-CM

## 2024-03-28 LAB
CK SERPL-CCNC: 81 U/L (ref 26–192)
FOLATE SERPL-MCNC: >40 NG/ML (ref 4.6–34.8)
HBA1C MFR BLD: 6.1 %
HCV AB SERPL QL IA: NONREACTIVE
HOLD SPECIMEN: NORMAL
LACTATE SERPL-SCNC: 1.6 MMOL/L (ref 0.7–2)
TOTAL PROTEIN SERUM FOR ELP: 6.8 G/DL (ref 6.4–8.3)

## 2024-03-28 PROCEDURE — 82550 ASSAY OF CK (CPK): CPT

## 2024-03-28 PROCEDURE — 83516 IMMUNOASSAY NONANTIBODY: CPT | Mod: 59

## 2024-03-28 PROCEDURE — 84207 ASSAY OF VITAMIN B-6: CPT

## 2024-03-28 PROCEDURE — 84155 ASSAY OF PROTEIN SERUM: CPT

## 2024-03-28 PROCEDURE — 84630 ASSAY OF ZINC: CPT

## 2024-03-28 PROCEDURE — 84425 ASSAY OF VITAMIN B-1: CPT

## 2024-03-28 PROCEDURE — 84446 ASSAY OF VITAMIN E: CPT

## 2024-03-28 PROCEDURE — 83605 ASSAY OF LACTIC ACID: CPT

## 2024-03-28 PROCEDURE — 86803 HEPATITIS C AB TEST: CPT

## 2024-03-28 PROCEDURE — 86255 FLUORESCENT ANTIBODY SCREEN: CPT

## 2024-03-28 PROCEDURE — 86334 IMMUNOFIX E-PHORESIS SERUM: CPT | Performed by: PATHOLOGY

## 2024-03-28 PROCEDURE — 86618 LYME DISEASE ANTIBODY: CPT

## 2024-03-28 PROCEDURE — 86038 ANTINUCLEAR ANTIBODIES: CPT

## 2024-03-28 PROCEDURE — 36415 COLL VENOUS BLD VENIPUNCTURE: CPT

## 2024-03-28 PROCEDURE — 83036 HEMOGLOBIN GLYCOSYLATED A1C: CPT

## 2024-03-28 PROCEDURE — 83921 ORGANIC ACID SINGLE QUANT: CPT

## 2024-03-28 PROCEDURE — 84210 ASSAY OF PYRUVATE: CPT

## 2024-03-28 PROCEDURE — 82746 ASSAY OF FOLIC ACID SERUM: CPT

## 2024-03-28 PROCEDURE — 84165 PROTEIN E-PHORESIS SERUM: CPT | Mod: TC | Performed by: PATHOLOGY

## 2024-03-28 NOTE — TELEPHONE ENCOUNTER
[facility]    Phone Message    May a detailed message be left on voicemail: yes     Reason for Call: Requesting Results     Name/type of test: Lab results   Date of test: 3/28/24  Was test done at a location other than [facility] (Please fill in the location if not [facility])?: No    Pt is requesting a call back in regards to her labs she had drawn this morning 3/28/24. Pt would like to discuss her results.    Please contact Pt to further discuss at [phone]    Action Taken: Message routed to:  Other: [facility]     Travel Screening: Not Applicable

## 2024-03-28 NOTE — RESULT ENCOUNTER NOTE
Dear Viki    Lab work shows an elevated hemoglobin A1c suggesting prediabetes.  Please watch her diet avoid carbohydrates and sugars and lose weight.  Also, discussed with your primary care physician for further management of prediabetes    Regards,  Rigoberto Lund MD

## 2024-03-29 LAB
ALBUMIN SERPL ELPH-MCNC: 4 G/DL (ref 3.7–5.1)
ALPHA1 GLOB SERPL ELPH-MCNC: 0.2 G/DL (ref 0.2–0.4)
ALPHA2 GLOB SERPL ELPH-MCNC: 0.9 G/DL (ref 0.5–0.9)
ANA SER QL IF: NEGATIVE
B BURGDOR IGG+IGM SER QL: 0.06
B-GLOBULIN SERPL ELPH-MCNC: 0.9 G/DL (ref 0.6–1)
GAMMA GLOB SERPL ELPH-MCNC: 0.7 G/DL (ref 0.7–1.6)
M PROTEIN SERPL ELPH-MCNC: 0 G/DL
PARANEOPLASTIC AB SER QL IF: NORMAL
PROT PATTERN SERPL ELPH-IMP: NORMAL
PROT PATTERN SERPL IFE-IMP: NORMAL
ZINC SERPL-MCNC: 85.1 UG/DL

## 2024-03-29 PROCEDURE — 86334 IMMUNOFIX E-PHORESIS SERUM: CPT | Mod: 26 | Performed by: PATHOLOGY

## 2024-03-29 PROCEDURE — 84165 PROTEIN E-PHORESIS SERUM: CPT | Mod: 26 | Performed by: PATHOLOGY

## 2024-03-29 NOTE — TELEPHONE ENCOUNTER
Called and spoke with patient, who notes some of the lab results have finalized, while others are still in process (patient does like that result notes become available through [employer]).    Writer advised that our team will wait for all labs to finalized and then reach out to patient and discuss plan of care relative to lab findings.    Patient amenable to plan.    Writer postponing and will reach out to patient.    Cezar Field RN, BSN  [facility]

## 2024-03-30 LAB
A-TOCOPHEROL VIT E SERPL-MCNC: 25.2 MG/L
BETA+GAMMA TOCOPHEROL SERPL-MCNC: 1.4 MG/L
GM1 GANGL IGG SER IA-ACNC: 4 IV
GM1 GANGL IGM SER IA-ACNC: 6 IV
PYRUVATE BLD-SCNC: 0.07 MMOL/L

## 2024-03-31 LAB — PYRIDOXAL PHOS SERPL-SCNC: 76 NMOL/L

## 2024-04-01 LAB — VIT B1 PYROPHOSHATE BLD-SCNC: 177 NMOL/L

## 2024-04-03 LAB — METHYLMALONATE SERPL-SCNC: 0.23 UMOL/L (ref 0–0.4)

## 2024-04-05 NOTE — TELEPHONE ENCOUNTER
Called and spoke with patient, conveying message about all completed lab work per Dr. Lund.       Dear Viki,  Except for elevated hemoglobin A1c rest of the labs are normal.  Regards,  Rigoberto Lund MD        Patient appreciated the call back.    Cezar Field RN, BSN  [facility]

## 2024-04-06 ENCOUNTER — HOSPITAL ENCOUNTER (OUTPATIENT)
Dept: MRI IMAGING | Facility: CLINIC | Age: 84
Discharge: HOME | End: 2024-04-06
Attending: PSYCHIATRY & NEUROLOGY | Admitting: PSYCHIATRY & NEUROLOGY
Payer: COMMERCIAL

## 2024-04-06 DIAGNOSIS — M48.02 CERVICAL STENOSIS OF SPINAL CANAL: ICD-10-CM

## 2024-04-06 PROCEDURE — 72141 MRI NECK SPINE W/O DYE: CPT

## 2024-06-05 ENCOUNTER — OFFICE VISIT (OUTPATIENT)
Dept: CARDIOLOGY | Facility: CLINIC | Age: 84
End: 2024-06-05
Attending: STUDENT IN AN ORGANIZED HEALTH CARE EDUCATION/TRAINING PROGRAM
Payer: COMMERCIAL

## 2024-06-05 VITALS
SYSTOLIC BLOOD PRESSURE: 152 MMHG | WEIGHT: 193.8 LBS | HEIGHT: 68 IN | BODY MASS INDEX: 29.37 KG/M2 | DIASTOLIC BLOOD PRESSURE: 82 MMHG | HEART RATE: 64 BPM | RESPIRATION RATE: 16 BRPM

## 2024-06-05 DIAGNOSIS — E78.5 HYPERLIPIDEMIA LDL GOAL <100: ICD-10-CM

## 2024-06-05 DIAGNOSIS — R06.09 DYSPNEA ON EXERTION: ICD-10-CM

## 2024-06-05 DIAGNOSIS — I49.3 PVC'S (PREMATURE VENTRICULAR CONTRACTIONS): ICD-10-CM

## 2024-06-05 PROCEDURE — 99214 OFFICE O/P EST MOD 30 MIN: CPT | Performed by: INTERNAL MEDICINE

## 2024-06-05 RX ORDER — ATENOLOL 50 MG/1
50 TABLET ORAL 2 TIMES DAILY
COMMUNITY
Start: 2024-03-25

## 2024-06-05 RX ORDER — ROSUVASTATIN CALCIUM 5 MG/1
2.5 TABLET, COATED ORAL EVERY OTHER DAY
Qty: 45 TABLET | Refills: 3 | Status: SHIPPED | OUTPATIENT
Start: 2024-06-05

## 2024-06-05 NOTE — LETTER
6/5/2024    Nura Boland MD  [address]  [place] [address]    RE: Viki Robert       Dear Colleague,     I had the pleasure of seeing Viki Robert in the [facility].    HEART CARE ENCOUNTER CONSULTATON NOTE      [facility]  [phone]      Assessment/Recommendations   Assessment:  1.  Dyspnea on exertion: Longstanding and likely multifactorial.  We have discussed further ischemic workup.  Last stress test done in 2021 was unremarkable.  She would like to hold off at this point.  2.  PVCs: Asymptomatic at this time  3.  Coronary artery disease with normal stress testing in the past  4.  Hypertension: Mildly elevated today, patient reports normally better controlled  5.  Hyperlipidemia: untreated due to multiple medication intolerances. Now would like to try a very low-dose of Crestor every other day  6.  Mild VIANEY untreated     Plan:  1.  Crestor 2.5 mg every other day  2.  Plan on follow-up in 1 year         History of Present Illness/Subjective    HPI: Viki Robert is a 84 year old female with history of PVCs, severe dyslipidemia with statin and zetia intolerance, mild coronary artery disease noted on previous CT calcium score 2015, peripheral neuropathy, balance issues and hypertension who I am seeing for a follow up visit.  In the past we had discussed a PCSK9 inhibitor due to her statin intolerance and she had declined.  Her sister is tolerating a low-dose of Crestor and she would like to try this.  Holter monitor last year showed increased burden of PVCs 15% however currently feeling much better since March without symptoms.  She is deconditioned and does not exercise much.  She has chronic dyspnea on exertion.  Denies worsening edema.  No orthopnea and denies chest pain.  She has some left shoulder discomfort and she is attributing that to sleep position.       CT calcium score of 23 checked on 1/26/2015    Echocardiogram  "10/4/2022  1. The left ventricle is normal in size. Left ventricular function is  normal.The ejection fraction is 55-60%. There is normal left ventricular wall  thickness. Left ventricular diastolic function is abnormal. No regional wall  motion abnormalities noted.  2. Normal right ventricle size and systolic function.  3. No hemodynamically significant valvular abnormalities on 2D or color flow  imaging.     Physical Examination  Review of Systems   Vitals: BP (!) 152/82 (BP Location: Right arm, Patient Position: Sitting, Cuff Size: Adult Regular)   Pulse 64   Resp 16   Ht 1.727 m (5' 8\")   Wt 87.9 kg (193 lb 12.8 oz)   BMI 29.47 kg/m    BMI= Body mass index is 29.47 kg/m .  Wt Readings from Last 3 Encounters:   06/05/24 87.9 kg (193 lb 12.8 oz)   03/27/24 88.5 kg (195 lb)   12/19/23 89.1 kg (196 lb 6.4 oz)       General Appearance:   no distress, normal body habitus   ENT/Mouth: membranes moist, no oral lesions or bleeding gums.      EYES:  no scleral icterus, normal conjunctivae   Neck: no carotid bruits or thyromegaly   Chest/Lungs:   lungs are clear to auscultation   Cardiovascular:   Regular. Normal first and second heart sounds with no murmur no edema bilaterally    Abdomen:  no organomegaly, masses, bruits, or tenderness; bowel sounds are present   Extremities: no cyanosis or clubbing   Skin: no xanthelasma, warm.    Neurologic: normal  bilateral, no tremors     Psychiatric: alert and oriented x3, calm        Please refer above for cardiac ROS details.        Medical History  Surgical History Family History Social History   cad Past Surgical History:   Procedure Laterality Date    BIOPSY BREAST Left 2000     Family History   Problem Relation Age of Onset    Breast Cancer Mother 90.00    Multiple myeloma Father 75.00    Breast Cancer Paternal Grandmother 100.00        Social History     Socioeconomic History    Marital status: Single     Spouse name: Not on file    Number of children: Not on file    " Years of education: Not on file    Highest education level: Not on file   Occupational History    Not on file   Tobacco Use    Smoking status: Never    Smokeless tobacco: Never   Vaping Use    Vaping status: Never Used   Substance and Sexual Activity    Alcohol use: Not on file    Drug use: Never    Sexual activity: Not on file   Other Topics Concern    Not on file   Social History Narrative    Not on file     Social Determinants of Health     Financial Resource Strain: High Risk (1/1/2022)    [facility] from [facility], [facility]    Financial Resource Strain     Difficulty of Paying Living Expenses: Not on file     Difficulty of Paying Living Expenses: Not on file   Food Insecurity: Not on file   Transportation Needs: Not on file   Physical Activity: Not on file   Stress: Not on file   Social Connections: Unknown (1/1/2022)    Received from [facility], [facility]    Social Connections     Frequency of Communication with Friends and Family: Not on file   Interpersonal Safety: Not on file   Housing Stability: Not on file           Medications  Allergies   Current Outpatient Medications   Medication Sig Dispense Refill    allopurinol (ZYLOPRIM) 100 MG tablet Take 150 mg by mouth daily      aspirin 81 MG EC tablet [ASPIRIN 81 MG EC TABLET] Take 81 mg by mouth daily.      atenolol (TENORMIN) 50 MG tablet Take 50 mg by mouth 2 times daily      lansoprazole (PREVACID) 15 MG capsule [LANSOPRAZOLE (PREVACID) 15 MG CAPSULE] Take 15 mg by mouth daily.      losartan (COZAAR) 100 MG tablet Take 1 tablet (100 mg) by mouth daily 90 tablet 1    multivitamin (ONE A DAY) per tablet Take 1 tablet by mouth daily         Allergies   Allergen Reactions    Atorvastatin Unknown     Achy & weak    Hydrochlorothiazide Unknown     Light headed    Lodine [Etodolac] Unknown    Naprosyn [Naproxen] Unknown      "Ankle swelling    Pravachol [Pravastatin] Unknown     Muscle aches & weakness    Statins [Statins] Muscle Pain (Myalgia)          Lab Results    Chemistry/lipid CBC Cardiac Enzymes/BNP/TSH/INR   Recent Labs   Lab Test 03/20/24  1522   CHOL 279*   HDL 35*   *   TRIG 394*     Recent Labs   Lab Test 03/20/24  1522 09/02/22  1145 09/03/21  1547   * 179* 187*     Recent Labs   Lab Test 03/20/24  1522      POTASSIUM 4.3   CHLORIDE 104   CO2 20*   *   BUN 17.6   CR 1.27*   GFRESTIMATED 42*   DORIE 9.9     Recent Labs   Lab Test 03/20/24  1522 11/21/23  1108 10/17/23  1045   CR 1.27* 1.27* 1.32*     Recent Labs   Lab Test 03/28/24  1117 05/09/19  1025   A1C 6.1* 6.1          Recent Labs   Lab Test 03/20/24  1522   WBC 7.7   HGB 14.1   HCT 41.6   MCV 93        Recent Labs   Lab Test 03/20/24  1522 05/09/19  1025   HGB 14.1 14.1    No results for input(s): \"TROPONINI\" in the last 77535 hours.  Recent Labs   Lab Test 10/11/22  1508 03/06/20  1550   BNP  --  82   NTBNP 271  --      Recent Labs   Lab Test 03/20/24  1522   TSH 2.02     No results for input(s): \"INR\" in the last 58800 hours.     Theresa Ortiz MD              Thank you for allowing me to participate in the care of your patient.      Sincerely,     Theresa Ortiz MD     [facility]  cc:   Chichi Hermosillo PA-C  [address] [place] [address]      "

## 2024-06-05 NOTE — PROGRESS NOTES
HEART CARE ENCOUNTER CONSULTATON NOTE      [facility]  [phone]      Assessment/Recommendations   Assessment:  1.  Dyspnea on exertion: Longstanding and likely multifactorial.  We have discussed further ischemic workup.  Last stress test done in 2021 was unremarkable.  She would like to hold off at this point.  2.  PVCs: Asymptomatic at this time  3.  Coronary artery disease with normal stress testing in the past  4.  Hypertension: Mildly elevated today, patient reports normally better controlled  5.  Hyperlipidemia: untreated due to multiple medication intolerances. Now would like to try a very low-dose of Crestor every other day  6.  Mild VIANEY untreated     Plan:  1.  Crestor 2.5 mg every other day  2.  Plan on follow-up in 1 year         History of Present Illness/Subjective    HPI: Viki Robert is a 84 year old female with history of PVCs, severe dyslipidemia with statin and zetia intolerance, mild coronary artery disease noted on previous CT calcium score 2015, peripheral neuropathy, balance issues and hypertension who I am seeing for a follow up visit.  In the past we had discussed a PCSK9 inhibitor due to her statin intolerance and she had declined.  Her sister is tolerating a low-dose of Crestor and she would like to try this.  Holter monitor last year showed increased burden of PVCs 15% however currently feeling much better since March without symptoms.  She is deconditioned and does not exercise much.  She has chronic dyspnea on exertion.  Denies worsening edema.  No orthopnea and denies chest pain.  She has some left shoulder discomfort and she is attributing that to sleep position.       CT calcium score of 23 checked on 1/26/2015    Echocardiogram 10/4/2022  1. The left ventricle is normal in size. Left ventricular function is  normal.The ejection fraction is 55-60%. There is normal left ventricular wall  thickness. Left ventricular diastolic function is abnormal. No regional  "wall  motion abnormalities noted.  2. Normal right ventricle size and systolic function.  3. No hemodynamically significant valvular abnormalities on 2D or color flow  imaging.     Physical Examination  Review of Systems   Vitals: BP (!) 152/82 (BP Location: Right arm, Patient Position: Sitting, Cuff Size: Adult Regular)   Pulse 64   Resp 16   Ht 1.727 m (5' 8\")   Wt 87.9 kg (193 lb 12.8 oz)   BMI 29.47 kg/m    BMI= Body mass index is 29.47 kg/m .  Wt Readings from Last 3 Encounters:   06/05/24 87.9 kg (193 lb 12.8 oz)   03/27/24 88.5 kg (195 lb)   12/19/23 89.1 kg (196 lb 6.4 oz)       General Appearance:   no distress, normal body habitus   ENT/Mouth: membranes moist, no oral lesions or bleeding gums.      EYES:  no scleral icterus, normal conjunctivae   Neck: no carotid bruits or thyromegaly   Chest/Lungs:   lungs are clear to auscultation   Cardiovascular:   Regular. Normal first and second heart sounds with no murmur no edema bilaterally    Abdomen:  no organomegaly, masses, bruits, or tenderness; bowel sounds are present   Extremities: no cyanosis or clubbing   Skin: no xanthelasma, warm.    Neurologic: normal  bilateral, no tremors     Psychiatric: alert and oriented x3, calm        Please refer above for cardiac ROS details.        Medical History  Surgical History Family History Social History   cad Past Surgical History:   Procedure Laterality Date    BIOPSY BREAST Left 2000     Family History   Problem Relation Age of Onset    Breast Cancer Mother 90.00    Multiple myeloma Father 75.00    Breast Cancer Paternal Grandmother 100.00        Social History     Socioeconomic History    Marital status: Single     Spouse name: Not on file    Number of children: Not on file    Years of education: Not on file    Highest education level: Not on file   Occupational History    Not on file   Tobacco Use    Smoking status: Never    Smokeless tobacco: Never   Vaping Use    Vaping status: Never Used   Substance " and Sexual Activity    Alcohol use: Not on file    Drug use: Never    Sexual activity: Not on file   Other Topics Concern    Not on file   Social History Narrative    Not on file     Social Determinants of Health     Financial Resource Strain: High Risk (1/1/2022)    Received from [facility], [facility]    Financial Resource Strain     Difficulty of Paying Living Expenses: Not on file     Difficulty of Paying Living Expenses: Not on file   Food Insecurity: Not on file   Transportation Needs: Not on file   Physical Activity: Not on file   Stress: Not on file   Social Connections: Unknown (1/1/2022)    Received from [facility], [facility]    Social Connections     Frequency of Communication with Friends and Family: Not on file   Interpersonal Safety: Not on file   Housing Stability: Not on file           Medications  Allergies   Current Outpatient Medications   Medication Sig Dispense Refill    allopurinol (ZYLOPRIM) 100 MG tablet Take 150 mg by mouth daily      aspirin 81 MG EC tablet [ASPIRIN 81 MG EC TABLET] Take 81 mg by mouth daily.      atenolol (TENORMIN) 50 MG tablet Take 50 mg by mouth 2 times daily      lansoprazole (PREVACID) 15 MG capsule [LANSOPRAZOLE (PREVACID) 15 MG CAPSULE] Take 15 mg by mouth daily.      losartan (COZAAR) 100 MG tablet Take 1 tablet (100 mg) by mouth daily 90 tablet 1    multivitamin (ONE A DAY) per tablet Take 1 tablet by mouth daily         Allergies   Allergen Reactions    Atorvastatin Unknown     Achy & weak    Hydrochlorothiazide Unknown     Light headed    Lodine [Etodolac] Unknown    Naprosyn [Naproxen] Unknown     Ankle swelling    Pravachol [Pravastatin] Unknown     Muscle aches & weakness    Statins [Statins] Muscle Pain (Myalgia)          Lab Results    Chemistry/lipid CBC Cardiac Enzymes/BNP/TSH/INR   Recent Labs   Lab Test 03/20/24  1522  "  CHOL 279*   HDL 35*   *   TRIG 394*     Recent Labs   Lab Test 03/20/24  1522 09/02/22  1145 09/03/21  1547   * 179* 187*     Recent Labs   Lab Test 03/20/24  1522      POTASSIUM 4.3   CHLORIDE 104   CO2 20*   *   BUN 17.6   CR 1.27*   GFRESTIMATED 42*   DORIE 9.9     Recent Labs   Lab Test 03/20/24  1522 11/21/23  1108 10/17/23  1045   CR 1.27* 1.27* 1.32*     Recent Labs   Lab Test 03/28/24  1117 05/09/19  1025   A1C 6.1* 6.1          Recent Labs   Lab Test 03/20/24  1522   WBC 7.7   HGB 14.1   HCT 41.6   MCV 93        Recent Labs   Lab Test 03/20/24  1522 05/09/19  1025   HGB 14.1 14.1    No results for input(s): \"TROPONINI\" in the last 95819 hours.  Recent Labs   Lab Test 10/11/22  1508 03/06/20  1550   BNP  --  82   NTBNP 271  --      Recent Labs   Lab Test 03/20/24  1522   TSH 2.02     No results for input(s): \"INR\" in the last 51321 hours.     Theresa Ortiz MD                                      "

## 2024-06-14 ENCOUNTER — HOSPITAL ENCOUNTER (OUTPATIENT)
Dept: ULTRASOUND IMAGING | Facility: CLINIC | Age: 84
Discharge: HOME | End: 2024-06-14
Attending: FAMILY MEDICINE | Admitting: FAMILY MEDICINE
Payer: COMMERCIAL

## 2024-06-14 DIAGNOSIS — E07.89 OTHER SPECIFIED DISORDERS OF THYROID: ICD-10-CM

## 2024-06-14 PROCEDURE — 76536 US EXAM OF HEAD AND NECK: CPT

## 2024-06-24 PROBLEM — D47.2 MGUS (MONOCLONAL GAMMOPATHY OF UNKNOWN SIGNIFICANCE): Status: ACTIVE | Noted: 2024-06-24

## 2024-06-24 NOTE — PROGRESS NOTES
NEUROLOGY FOLLOW UP VISIT  NOTE       [facility] [place]  [address] [place] [address]  Tel: [phone]  Fax: [phone]  [email or web address]     Viki Robert,  1940, MRN [id number]  PCP: Nura Boland  Date: 2024      ASSESSMENT & PLAN     Visit Diagnosis   Polyneuropathy, peripheral sensorimotor axonal  MGUS (monoclonal gammopathy of unknown significance)     Generalized muscle weakness; H/O borderline sensorimotor polyneuropathy, cervical spine stenosis & statin myopathy  83-year-old female with CKD stage III, vitamin D deficiency, HLD, IFG, HTN and gout who was evaluated in  for generalized weakness and found to have peripheral neuropathy and cervical spine stenosis who was previously seen in our clinic in  and  for early neuropathy was reevaluated on 3/27/2024 with complaint of worsening gait.  She had a repeat EMG that is borderline normal due to borderline CMAP and conduction velocities.  Lab work for common causes of neuropathy was normal except for elevated hemoglobin A1c and monoclonal gammopathy.  She also had hyperreflexia and MRI cervical spine was done that showed multilevel degenerative changes with moderate stenosis at C3-C4 and C6-C7 incidentally thyroid lesion was noted and subsequently ultrasound was done and she is scheduled for a biopsy I have recommended:    1.  Manage prediabetes with weight loss, watching her diet, avoiding carbohydrates and sugars  2.  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  Refer to physical therapy for moderate cervical spine stenosis  4.  Patient is also scheduled for thyroid biopsy  5.  Follow-up in 6 months    Thank you again for this referral, please feel free to contact me if you have any questions.    Rigoberto Lund MD  [facility]UR[facility], [place]     HISTORY OF PRESENT ILLNESS     Patient is 84-year-old female with CKD  stage III, vitamin D deficiency, HLD, IFG, HTN, statin myopathy, and gout who was evaluated in 2015 for generalized weakness and found to have peripheral neuropathy and cervical spine stenosis last seen on 3/27/2024 for worsening symptoms.  She started using a walker and during her last visit lab work was ordered for common causes of neuropathy that included an elevated hemoglobin A1c suggesting prediabetes.  Rest of the labs included normal CK, LDH, pyruvic acid, antinuclear antibody, of folate, GM1 antibody, hepatitis C antibody, Lyme titer, MMA, paraneoplastic panel, SPEP, abnormal immunofixation (Faint monoclonal IgM immunoglobulin of lambda light chain type.),  Normal B1, B6, vitamin E and zinc.  MRI cervical spine showed multilevel degenerative changes. thyroid lesions were identified and ultrasound was recommended that showed 1.7 cm nodule that meets the criteria of FNA.  She is scheduled for biopsy next month.  EMG was borderline normal with low amplitudes of peroneal and tibial CMAP and borderline conduction.    Patient was previously seen in our clinic in 2015 when she had reported that she had poor stamina for a long time but in 2015 had noticed a marked decrease in her strength with weakness and inability to stand up from a sitting position and shortness of breath along with decreased balance and leg pain.  She preferred her legs dangling off the bed and inability to stand for even a short period of time.  She had a evaluation by cardiology at that time and her EKG, FAITH and cardiac exam were normal.  She also had PFTs done that were normal.  Prior to that presentation in 2010 she was evaluated in our clinic also for loss of taste and smell and had MRI of the brain that was unremarkable.  Although she has history of statin myopathy she had been off statin for multiple years.  After her last visit in 2015 she had MRI of cervical spine that showed moderate C3-C4 through C6/C7 stenosis and multilevel  neuroforaminal stenosis and lumbar spine showed moderate degenerative changes with neuroforaminal stenosis.  EMG was also done that suggested early mixed sensorimotor polyneuropathy and there were findings to suggest early myopathy.       PROBLEM LIST   Patient Active Problem List   Diagnosis    Essential hypertension    Fibrocystic breast disease    Hyperlipidemia    PVC's (premature ventricular contractions)    Gout    Moderate C3-C4 to C6-C7 stenosis    Chronic kidney disease, stage 3b (H)    Polyneuropathy, peripheral sensorimotor axonal    MGUS (monoclonal gammopathy of unknown significance)    Cervical spondylosis without myelopathy         PAST MEDICAL & SURGICAL HISTORY     Past Medical History:   Patient  has no past medical history on file.    Surgical History:  She  has a past surgical history that includes Biopsy breast (Left, 2000).     SOCIAL HISTORY     Reviewed, and she  reports that she has never smoked. She has never used smokeless tobacco. She reports that she does not use drugs.     FAMILY HISTORY     Reviewed, and family history includes Breast Cancer (age of onset: 100.00) in her paternal grandmother; Breast Cancer (age of onset: 90.00) in her mother; Multiple myeloma (age of onset: 75.00) in her father.     ALLERGIES     Allergies   Allergen Reactions    Atorvastatin Unknown     Achy & weak    Hydrochlorothiazide Unknown     Light headed    Lodine [Etodolac] Unknown    Naprosyn [Naproxen] Unknown     Ankle swelling    Pravachol [Pravastatin] Unknown     Muscle aches & weakness    Statins [Statins] Muscle Pain (Myalgia)         REVIEW OF SYSTEMS     A 12 point review of system was performed and was negative except as outlined in the history of present illness.     HOME MEDICATIONS     Current Outpatient Rx   Medication Sig Dispense Refill    aspirin 81 MG EC tablet [ASPIRIN 81 MG EC TABLET] Take 81 mg by mouth daily.      atenolol (TENORMIN) 50 MG tablet Take 50 mg by mouth 2 times daily       "lansoprazole (PREVACID) 15 MG capsule [LANSOPRAZOLE (PREVACID) 15 MG CAPSULE] Take 15 mg by mouth daily.      losartan (COZAAR) 100 MG tablet Take 1 tablet (100 mg) by mouth daily 90 tablet 1    multivitamin (ONE A DAY) per tablet Take 1 tablet by mouth daily      rosuvastatin (CRESTOR) 5 MG tablet Take 0.5 tablets (2.5 mg) by mouth every other day 45 tablet 3    allopurinol (ZYLOPRIM) 100 MG tablet Take 150 mg by mouth daily           PHYSICAL EXAM     Vital signs  /72   Pulse 71   Ht 1.727 m (5' 8\")   Wt 87.5 kg (193 lb)   BMI 29.35 kg/m      Weight:   193 lbs 0 oz    Pleasant elderly female who is alert and oriented vital signs are reviewed and documented in electronic medical record. Neck supple. Neurologically speech was normal. Cranial nerves II through XII are intact motor strength 5/5 except proximally in the lower extremity 4+/5 reflexes are 3+ questionable upgoing toe on the right equivocal toe on the left. Sensation decreased to light touch pinprick vibratory sensation below knees bilaterally. She walks with a walker. No dysmetria noted on finger-nose testing.      PERTINENT DIAGNOSTIC STUDIES     Following studies were reviewed:     MRI CERVICAL SPINE 4/6/2024  1.  Multilevel degenerative changes as detailed above. No cord signal abnormality. Please see above discussion for level specific findings.  2.  Thyroid lesions partially identified on this examination, recommend dedicated thyroid ultrasound.    MRI CERVICAL SPINE 12/13/2015  1.  Moderate multilevel degenerative changes in the cervical spine, as detailed.  2.  C3-C4 through C6-C7 mild to moderate central canal stenosis.  3.  Mild multilevel foraminal narrowing, as detailed. C6-C7 mild to moderate foraminal narrowing.  4.  Equivocal tiny focus of T2 signal hyperintensity in the left aspect of the cord at the C5 vertebral body level, nonspecific.     MRI LUMBAR SPINE 12/13/2015  1.  Mild to moderate degenerative changes in the lumbar " spine, as detailed.  2.  L4-L5 disc osteophyte complex and moderate to severe facet arthropathy. Mild to moderate foraminal narrowing. Central canal remains adequately patent.  3.  L5-S1 disc osteophyte complex with moderate left and mild right foraminal narrowing. Contact of the exiting left L5 nerve root. Central canal remains adequately patent.     MRI BRAIN 7/13/2010  1.  There are few small scattered foci of nonspecific T2/FLAIR hyperintensity in the cerebral white matter which are nonspecific, but could represent chronic small vessel ischemic change.  2.  The head MRI otherwise is normal.     EMG 7/3/2024  This is a borderline normal nerve conduction and EMG study of lower extremity.  Please note normal EMG study does not rule out the possibility of small fiber neuropathy.  Clinical correlation is recommended.    EMG 2/18/2015  This is an abnormal nerve conduction and EMG study of lower extremity that suggests early sensory motor predominantly axonal polyneuropathy.  In addition, there is evidence of possible early myopathy, clinical correlation is recommended.     ECHOCARDIOGRAM 10/4/2022  1. The left ventricle is normal in size. Left ventricular function is  normal.The ejection fraction is 55-60%. There is normal left ventricular wall  thickness. Left ventricular diastolic function is abnormal. No regional wall  motion abnormalities noted.  2. Normal right ventricle size and systolic function.  3. No hemodynamically significant valvular abnormalities on 2D or color flow  imaging.     HOLTER MONITOR 4/14/2023      ULTRASOUND THYROID 6/14/2024  1. Left mid TR 4 nodule (1.7 cm) meets criteria for FNA.     PERTINENT LABS  Following labs were reviewed:  No visits with results within 3 Month(s) from this visit.   Latest known visit with results is:   Lab on 03/28/2024   Component Date Value Ref Range Status    Zinc, Serum/Plasma 03/28/2024 85.1  60.0 - 120.0 ug/dL Final    Vitamin E 03/28/2024 25.2 (H)  5.5 - 18.0  mg/L Final    Vitamin E Gamma 03/28/2024 1.4  0.0 - 6.0 mg/L Final    Vitamin B6 03/28/2024 76.0  20.0 - 125.0 nmol/L Final    Vitamin B1 Whole Blood Level 03/28/2024 177  70 - 180 nmol/L Final    Immunofixation ELP 03/28/2024 Faint monoclonal IgM immunoglobulin of lambda light chain type. Pathologic significance requires clinical correlation. Eliza Brady MD   Final    Purkinje Cell/Neuronal Nuclear IgG* 03/28/2024 None Detected  None Detected Final    Methylmalonic Acid 03/28/2024 0.23  0.00 - 0.40 umol/L Final    Lyme Disease Antibodies Total 03/28/2024 0.06  <0.90 Final    Hemoglobin A1C 03/28/2024 6.1 (H)  <5.7 % Final    Hepatitis C Antibody 03/28/2024 Nonreactive  Nonreactive Final    GM1 Antibody IgG 03/28/2024 4  0 - 50 IV Final    GM1 Antibody IgM 03/28/2024 6  0 - 50 IV Final    Folic Acid 03/28/2024 >40.0 (H)  4.6 - 34.8 ng/mL Final    RASTA interpretation 03/28/2024 Negative  Negative Final    Pyruvic Acid 03/28/2024 0.067  0.030 - 0.107 mmol/L Final    Lactic Acid 03/28/2024 1.6  0.7 - 2.0 mmol/L Final    CK 03/28/2024 81  26 - 192 U/L Final    Total Protein Serum for ELP 03/28/2024 6.8  6.4 - 8.3 g/dL Final    Albumin 03/28/2024 4.0  3.7 - 5.1 g/dL Final    Alpha 1 03/28/2024 0.2  0.2 - 0.4 g/dL Final    Alpha 2 03/28/2024 0.9  0.5 - 0.9 g/dL Final    Beta Globulin 03/28/2024 0.9  0.6 - 1.0 g/dL Final    Gamma Globulin 03/28/2024 0.7  0.7 - 1.6 g/dL Final    Monoclonal Peak 03/28/2024 0.0  <=0.0 g/dL Final    ELP Interpretation 03/28/2024    Final                    Value:No monoclonal protein seen by capillary electrophoresis, however a monoclonal protein was seen in this sample by immunofixation which is a more sensitive method for monoclonal detection. Pathologic significance requires clinical correlation. Eliza Brady MD    Hold Specimen 03/28/2024 [facility]   Final         Total time spent for face to face visit, reviewing labs/imaging studies, counseling and coordination of care was: 30  Minutes spent on the date of the encounter doing chart review, review of outside records, review of test results, interpretation of tests, patient visit, and documentation     The longitudinal plan of care for the diagnosis(es)/condition(s) as documented were addressed during this visit. Due to the added complexity in care, I will continue to support Viki in the subsequent management and with ongoing continuity of care.    This note was dictated using voice recognition software.  Any grammatical or context distortions are unintentional and inherent to the software.    Orders Placed This Encounter   Procedures    Adult Oncology/Hematology  Referral    Physical Therapy  Referral      New Prescriptions    No medications on file     Modified Medications    No medications on file

## 2024-07-03 ENCOUNTER — OFFICE VISIT (OUTPATIENT)
Dept: NEUROLOGY | Facility: CLINIC | Age: 84
End: 2024-07-03
Attending: PSYCHIATRY & NEUROLOGY
Payer: COMMERCIAL

## 2024-07-03 ENCOUNTER — PATIENT OUTREACH (OUTPATIENT)
Dept: ONCOLOGY | Facility: CLINIC | Age: 84
End: 2024-07-03

## 2024-07-03 VITALS
BODY MASS INDEX: 29.25 KG/M2 | WEIGHT: 193 LBS | HEIGHT: 68 IN | DIASTOLIC BLOOD PRESSURE: 72 MMHG | SYSTOLIC BLOOD PRESSURE: 126 MMHG | HEART RATE: 71 BPM

## 2024-07-03 DIAGNOSIS — G60.8 POLYNEUROPATHY, PERIPHERAL SENSORIMOTOR AXONAL: Primary | ICD-10-CM

## 2024-07-03 DIAGNOSIS — D47.2 MGUS (MONOCLONAL GAMMOPATHY OF UNKNOWN SIGNIFICANCE): ICD-10-CM

## 2024-07-03 DIAGNOSIS — M62.81 MUSCLE WEAKNESS (GENERALIZED): ICD-10-CM

## 2024-07-03 DIAGNOSIS — M47.812 CERVICAL SPONDYLOSIS WITHOUT MYELOPATHY: ICD-10-CM

## 2024-07-03 DIAGNOSIS — M48.02 CERVICAL STENOSIS OF SPINAL CANAL: ICD-10-CM

## 2024-07-03 PROCEDURE — 95886 MUSC TEST DONE W/N TEST COMP: CPT | Mod: RT | Performed by: PSYCHIATRY & NEUROLOGY

## 2024-07-03 PROCEDURE — 95910 NRV CNDJ TEST 7-8 STUDIES: CPT | Performed by: PSYCHIATRY & NEUROLOGY

## 2024-07-03 PROCEDURE — 95886 MUSC TEST DONE W/N TEST COMP: CPT | Mod: 59 | Performed by: PSYCHIATRY & NEUROLOGY

## 2024-07-03 NOTE — NURSING NOTE
Chief Complaint   Patient presents with    NEUROPATHY     EMG results      Spring Carter on 7/3/2024 at 10:10 AM

## 2024-07-03 NOTE — PROCEDURES
ELECTROMYOGRAPHY (EMG) REPORT       [facility] NEUROLOGY [place]  [address] [place] [address]  Tel: [phone]  Fax: [phone]  [email or web address]     Viki Robert,  1940, MRN [id number]  PCP: Nura Boland  Date: 7/3/2024     Principal Diagnosis: Polyneuropathy, peripheral sensorimotor axonal     Height: 5 feet 8 inch  Reason for referral: Evaluate bilateral lowers. c/o balance difficulty, weakness in both legs > 10 years.       Motor NCS      Nerve / Sites Lat Amp Dist Ace    ms mV cm m/s   R Peroneal - EDB      Ankle 4.58 3.1 8       Fib head 11.30 2.6 32 48      Pop fossa 13.59 2.6 12 52   L Peroneal - EDB      Ankle 5.21 3.6 8       Fib head 12.24 2.8 33.5 48      Pop fossa 14.48 2.7 11 49   R Tibial - AH      Ankle 4.22 6.2 8       Pop fossa 13.75 6.2 39 41   L Tibial - AH      Ankle 4.27 7.0 8       Pop fossa 13.54 7.1 41 44       F  Wave      Nerve Fmin    ms   R Tibial - AH 51.51   L Tibial - AH 51.56       Sensory NCS      Nerve / Sites Onset Lat Pk Lat Amp.2-3 Dist Ace    ms ms  V cm m/s   R Sural - Ankle (Calf)      Calf 2.92 3.54 9.8 14 48   L Sural - Ankle (Calf)      Calf 2.92 3.59 6.7 14 48   R Superficial peroneal - Ankle      Lat leg 2.55 3.44 12.1 12 47   L Superficial peroneal - Ankle      Lat leg 2.86 3.75 12.2 12 42       EMG Summary Table     Spontaneous MUAP Rcmt Note   Muscle Fib PSW Fasc IA # Amp Dur PPP Rate Type   R. Gluteus medius None None None N N N N N N N   R. Gluteus aaron None None None N N N N N N N   R. L3 paraspinal None None None N N N N N N N   R. L4 paraspinal None None None N N N N N N N   R. L5 paraspinal None None None N N N N N N N   R. S1 paraspinal None None None N N N N N N N   R. Adductor chirag None None None N N N N N N N   R. Quadriceps None None None N N N N N N N   R. Tibialis anterior None None None N N N N N N N   R. Gastrocnemius (Medial head) None None None N N N N N N N   L. Adductor chirag None None  None N N N N N N N   L. Quadriceps None None None N N N N N N N   L. Tibialis anterior None None None N N N N N N N   L. Gastrocnemius (Medial head) None None None N N N N N N N   L. Tibialis posterior None None None N N N N N N N        Summary: Nerve conduction and EMG study of bilateral lower extremity shows:  Borderline normal bilateral peroneal and tibial CMAP with normal latencies and conduction velocities.  Normal bilateral tibial F latencies  Normal bilateral sural and superficial peroneal SNAP  Needle exam was normal    Impression:   This is a borderline normal nerve conduction and EMG study of lower extremity.  Please note normal EMG study does not rule out the possibility of small fiber neuropathy.  Clinical correlation is recommended.      Rigoberto Lund MD  [facility]      This note was dictated using voice recognition software.  Any grammatical or context distortions are unintentional and inherent to the software.

## 2024-07-03 NOTE — LETTER
7/3/2024      Viki MARSHALL Jakob  [address] [place] [address]      Dear Colleague,    Thank you for referring your patient, Viki Robert, to the [facility] [place]. Please see a copy of my visit note below.    NEUROLOGY FOLLOW UP VISIT  NOTE       [facility] [place]  [address] [place] [address]  Tel: [phone]  Fax: [phone]  [email or web address]     Viki Robert,  1940, MRN [id number]  PCP: Nura Boland  Date: 2024      ASSESSMENT & PLAN     Visit Diagnosis   Polyneuropathy, peripheral sensorimotor axonal  MGUS (monoclonal gammopathy of unknown significance)     Generalized muscle weakness; H/O borderline sensorimotor polyneuropathy, cervical spine stenosis & statin myopathy  83-year-old female with CKD stage III, vitamin D deficiency, HLD, IFG, HTN and gout who was evaluated in  for generalized weakness and found to have peripheral neuropathy and cervical spine stenosis who was previously seen in our clinic in  and  for early neuropathy was reevaluated on 3/27/2024 with complaint of worsening gait.  She had a repeat EMG that is borderline normal due to borderline CMAP and conduction velocities.  Lab work for common causes of neuropathy was normal except for elevated hemoglobin A1c and monoclonal gammopathy.  She also had hyperreflexia and MRI cervical spine was done that showed multilevel degenerative changes with moderate stenosis at C3-C4 and C6-C7 incidentally thyroid lesion was noted and subsequently ultrasound was done and she is scheduled for a biopsy I have recommended:    1.  Manage prediabetes with weight loss, watching her diet, avoiding carbohydrates and sugars  2.  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  Refer to physical therapy for moderate cervical spine stenosis  4.  Patient is also scheduled for thyroid biopsy  5.  Follow-up  in 6 months    Thank you again for this referral, please feel free to contact me if you have any questions.    Rigoberto Lund MD  [facility] [place]     HISTORY OF PRESENT ILLNESS     Patient is 84-year-old female with CKD stage III, vitamin D deficiency, HLD, IFG, HTN, statin myopathy, and gout who was evaluated in 2015 for generalized weakness and found to have peripheral neuropathy and cervical spine stenosis last seen on 3/27/2024 for worsening symptoms.  She started using a walker and during her last visit lab work was ordered for common causes of neuropathy that included an elevated hemoglobin A1c suggesting prediabetes.  Rest of the labs included normal CK, LDH, pyruvic acid, antinuclear antibody, of folate, GM1 antibody, hepatitis C antibody, Lyme titer, MMA, paraneoplastic panel, SPEP, abnormal immunofixation (Faint monoclonal IgM immunoglobulin of lambda light chain type.),  Normal B1, B6, vitamin E and zinc.  MRI cervical spine showed multilevel degenerative changes. thyroid lesions were identified and ultrasound was recommended that showed 1.7 cm nodule that meets the criteria of FNA.  She is scheduled for biopsy next month.  EMG was borderline normal with low amplitudes of peroneal and tibial CMAP and borderline conduction.    Patient was previously seen in our clinic in 2015 when she had reported that she had poor stamina for a long time but in 2015 had noticed a marked decrease in her strength with weakness and inability to stand up from a sitting position and shortness of breath along with decreased balance and leg pain.  She preferred her legs dangling off the bed and inability to stand for even a short period of time.  She had a evaluation by cardiology at that time and her EKG, FAITH and cardiac exam were normal.  She also had PFTs done that were normal.  Prior to that presentation in 2010 she was evaluated in our clinic also for loss of taste and smell and had MRI of the brain  that was unremarkable.  Although she has history of statin myopathy she had been off statin for multiple years.  After her last visit in 2015 she had MRI of cervical spine that showed moderate C3-C4 through C6/C7 stenosis and multilevel neuroforaminal stenosis and lumbar spine showed moderate degenerative changes with neuroforaminal stenosis.  EMG was also done that suggested early mixed sensorimotor polyneuropathy and there were findings to suggest early myopathy.       PROBLEM LIST   Patient Active Problem List   Diagnosis     Essential hypertension     Fibrocystic breast disease     Hyperlipidemia     PVC's (premature ventricular contractions)     Gout     Moderate C3-C4 to C6-C7 stenosis     Chronic kidney disease, stage 3b (H)     Polyneuropathy, peripheral sensorimotor axonal     MGUS (monoclonal gammopathy of unknown significance)     Cervical spondylosis without myelopathy         PAST MEDICAL & SURGICAL HISTORY     Past Medical History:   Patient  has no past medical history on file.    Surgical History:  She  has a past surgical history that includes Biopsy breast (Left, 2000).     SOCIAL HISTORY     Reviewed, and she  reports that she has never smoked. She has never used smokeless tobacco. She reports that she does not use drugs.     FAMILY HISTORY     Reviewed, and family history includes Breast Cancer (age of onset: 100.00) in her paternal grandmother; Breast Cancer (age of onset: 90.00) in her mother; Multiple myeloma (age of onset: 75.00) in her father.     ALLERGIES     Allergies   Allergen Reactions     Atorvastatin Unknown     Achy & weak     Hydrochlorothiazide Unknown     Light headed     Lodine [Etodolac] Unknown     Naprosyn [Naproxen] Unknown     Ankle swelling     Pravachol [Pravastatin] Unknown     Muscle aches & weakness     Statins [Statins] Muscle Pain (Myalgia)         REVIEW OF SYSTEMS     A 12 point review of system was performed and was negative except as outlined in the history of  "present illness.     HOME MEDICATIONS     Current Outpatient Rx   Medication Sig Dispense Refill     aspirin 81 MG EC tablet [ASPIRIN 81 MG EC TABLET] Take 81 mg by mouth daily.       atenolol (TENORMIN) 50 MG tablet Take 50 mg by mouth 2 times daily       lansoprazole (PREVACID) 15 MG capsule [LANSOPRAZOLE (PREVACID) 15 MG CAPSULE] Take 15 mg by mouth daily.       losartan (COZAAR) 100 MG tablet Take 1 tablet (100 mg) by mouth daily 90 tablet 1     multivitamin (ONE A DAY) per tablet Take 1 tablet by mouth daily       rosuvastatin (CRESTOR) 5 MG tablet Take 0.5 tablets (2.5 mg) by mouth every other day 45 tablet 3     allopurinol (ZYLOPRIM) 100 MG tablet Take 150 mg by mouth daily           PHYSICAL EXAM     Vital signs  /72   Pulse 71   Ht 1.727 m (5' 8\")   Wt 87.5 kg (193 lb)   BMI 29.35 kg/m      Weight:   193 lbs 0 oz    Pleasant elderly female who is alert and oriented vital signs are reviewed and documented in electronic medical record. Neck supple. Neurologically speech was normal. Cranial nerves II through XII are intact motor strength 5/5 except proximally in the lower extremity 4+/5 reflexes are 3+ questionable upgoing toe on the right equivocal toe on the left. Sensation decreased to light touch pinprick vibratory sensation below knees bilaterally. She walks with a walker. No dysmetria noted on finger-nose testing.      PERTINENT DIAGNOSTIC STUDIES     Following studies were reviewed:     MRI CERVICAL SPINE 4/6/2024  1.  Multilevel degenerative changes as detailed above. No cord signal abnormality. Please see above discussion for level specific findings.  2.  Thyroid lesions partially identified on this examination, recommend dedicated thyroid ultrasound.    MRI CERVICAL SPINE 12/13/2015  1.  Moderate multilevel degenerative changes in the cervical spine, as detailed.  2.  C3-C4 through C6-C7 mild to moderate central canal stenosis.  3.  Mild multilevel foraminal narrowing, as detailed. C6-C7 " mild to moderate foraminal narrowing.  4.  Equivocal tiny focus of T2 signal hyperintensity in the left aspect of the cord at the C5 vertebral body level, nonspecific.     MRI LUMBAR SPINE 12/13/2015  1.  Mild to moderate degenerative changes in the lumbar spine, as detailed.  2.  L4-L5 disc osteophyte complex and moderate to severe facet arthropathy. Mild to moderate foraminal narrowing. Central canal remains adequately patent.  3.  L5-S1 disc osteophyte complex with moderate left and mild right foraminal narrowing. Contact of the exiting left L5 nerve root. Central canal remains adequately patent.     MRI BRAIN 7/13/2010  1.  There are few small scattered foci of nonspecific T2/FLAIR hyperintensity in the cerebral white matter which are nonspecific, but could represent chronic small vessel ischemic change.  2.  The head MRI otherwise is normal.     EMG 7/3/2024  This is a borderline normal nerve conduction and EMG study of lower extremity.  Please note normal EMG study does not rule out the possibility of small fiber neuropathy.  Clinical correlation is recommended.    EMG 2/18/2015  This is an abnormal nerve conduction and EMG study of lower extremity that suggests early sensory motor predominantly axonal polyneuropathy.  In addition, there is evidence of possible early myopathy, clinical correlation is recommended.     ECHOCARDIOGRAM 10/4/2022  1. The left ventricle is normal in size. Left ventricular function is  normal.The ejection fraction is 55-60%. There is normal left ventricular wall  thickness. Left ventricular diastolic function is abnormal. No regional wall  motion abnormalities noted.  2. Normal right ventricle size and systolic function.  3. No hemodynamically significant valvular abnormalities on 2D or color flow  imaging.     HOLTER MONITOR 4/14/2023      ULTRASOUND THYROID 6/14/2024  1. Left mid TR 4 nodule (1.7 cm) meets criteria for FNA.     PERTINENT LABS  Following labs were reviewed:  No  visits with results within 3 Month(s) from this visit.   Latest known visit with results is:   Lab on 03/28/2024   Component Date Value Ref Range Status     Zinc, Serum/Plasma 03/28/2024 85.1  60.0 - 120.0 ug/dL Final     Vitamin E 03/28/2024 25.2 (H)  5.5 - 18.0 mg/L Final     Vitamin E Gamma 03/28/2024 1.4  0.0 - 6.0 mg/L Final     Vitamin B6 03/28/2024 76.0  20.0 - 125.0 nmol/L Final     Vitamin B1 Whole Blood Level 03/28/2024 177  70 - 180 nmol/L Final     Immunofixation ELP 03/28/2024 Faint monoclonal IgM immunoglobulin of lambda light chain type. Pathologic significance requires clinical correlation. Eliza Brady MD   Final     Purkinje Cell/Neuronal Nuclear IgG* 03/28/2024 None Detected  None Detected Final     Methylmalonic Acid 03/28/2024 0.23  0.00 - 0.40 umol/L Final     Lyme Disease Antibodies Total 03/28/2024 0.06  <0.90 Final     Hemoglobin A1C 03/28/2024 6.1 (H)  <5.7 % Final     Hepatitis C Antibody 03/28/2024 Nonreactive  Nonreactive Final     GM1 Antibody IgG 03/28/2024 4  0 - 50 IV Final     GM1 Antibody IgM 03/28/2024 6  0 - 50 IV Final     Folic Acid 03/28/2024 >40.0 (H)  4.6 - 34.8 ng/mL Final     RASTA interpretation 03/28/2024 Negative  Negative Final     Pyruvic Acid 03/28/2024 0.067  0.030 - 0.107 mmol/L Final     Lactic Acid 03/28/2024 1.6  0.7 - 2.0 mmol/L Final     CK 03/28/2024 81  26 - 192 U/L Final     Total Protein Serum for ELP 03/28/2024 6.8  6.4 - 8.3 g/dL Final     Albumin 03/28/2024 4.0  3.7 - 5.1 g/dL Final     Alpha 1 03/28/2024 0.2  0.2 - 0.4 g/dL Final     Alpha 2 03/28/2024 0.9  0.5 - 0.9 g/dL Final     Beta Globulin 03/28/2024 0.9  0.6 - 1.0 g/dL Final     Gamma Globulin 03/28/2024 0.7  0.7 - 1.6 g/dL Final     Monoclonal Peak 03/28/2024 0.0  <=0.0 g/dL Final     ELP Interpretation 03/28/2024    Final                    Value:No monoclonal protein seen by capillary electrophoresis, however a monoclonal protein was seen in this sample by immunofixation which is a more  sensitive method for monoclonal detection. Pathologic significance requires clinical correlation. Eliza Brady MD     Hold Specimen 03/28/2024 [facility]   Final         Total time spent for face to face visit, reviewing labs/imaging studies, counseling and coordination of care was: 30 Minutes spent on the date of the encounter doing chart review, review of outside records, review of test results, interpretation of tests, patient visit, and documentation     The longitudinal plan of care for the diagnosis(es)/condition(s) as documented were addressed during this visit. Due to the added complexity in care, I will continue to support Viki in the subsequent management and with ongoing continuity of care.    This note was dictated using voice recognition software.  Any grammatical or context distortions are unintentional and inherent to the software.    Orders Placed This Encounter   Procedures     Adult Oncology/Hematology  Referral     Physical Therapy  Referral      New Prescriptions    No medications on file     Modified Medications    No medications on file                 Again, thank you for allowing me to participate in the care of your patient.        Sincerely,        Rigoberto Lund MD

## 2024-07-03 NOTE — PROGRESS NOTES
New Patient Oncology Nurse Navigator Note     Referring provider: Rigoberto Lund MD      Referring Clinic/Organization: [facility]UROLOG[facility]      Referred to (specialty:) Hematology/Oncology     Requested provider (if applicable): LAKIA     Date Referral Received: July 3, 2024     Evaluation for:    G60.8 (ICD-10-CM) - Polyneuropathy, peripheral sensorimotor axonal   D47.2 (ICD-10-CM) - MGUS (monoclonal gammopathy of unknown significance)        Clinical History (per Nurse review of records provided):      Patient was seen today in follow up by neurology for:   Polyneuropathy, peripheral sensorimotor axonal  MGUS (monoclonal gammopathy of unknown significance)     Latest Reference Range & Units 03/28/24 11:17   Albumin Fraction 3.7 - 5.1 g/dL 4.0   Alpha 1 Fraction 0.2 - 0.4 g/dL 0.2   Alpha 2 Fraction 0.5 - 0.9 g/dL 0.9   ANTI NUCLEAR PA IGG BY IFA WITH REFLEX  Rpt   Beta Fraction 0.6 - 1.0 g/dL 0.9   ELP Interpretation:  No monoclonal protein seen by capillary electrophoresis, however a monoclonal protein was seen in this sample by immunofixation which is a more sensitive method for monoclonal detection. Pathologic significance requires clinical correlation. Eliza Brady MD   Gamma Fraction 0.7 - 1.6 g/dL 0.7   Immunofixation ELP  Faint monoclonal IgM immunoglobulin of lambda light chain type. Pathologic significance requires clinical correlation. Eliza Brady MD   Monoclonal Peak <=0.0 g/dL 0.0   Total Protein Serum for ELP 6.4 - 8.3 g/dL 6.8   Rpt: View report in Results Review for more information     Records Location: See Bookmarked material     Records Needed: NA     Additional testing needed prior to consult: NA    Payor: [employer] / Plan: [employer] MEDICARE / Product Type: HMO /     July 3, 2024  Referral received and reviewed.  Sent to NPS for processing.     Noreen LA, RN   Oncology Nurse Navigator   [facility]   [phone] /  [phone]

## 2024-07-03 NOTE — LETTER
7/3/2024      Viki Robert  [address] [place] [address]      Dear Colleague,    Thank you for referring your patient, Viki Robert, to the [facility] [place]. Please see a copy of my visit note below.    See procedure note for EMG report      Again, thank you for allowing me to participate in the care of your patient.        Sincerely,        Rigoberto Lund MD

## 2024-07-16 DIAGNOSIS — I10 BENIGN ESSENTIAL HYPERTENSION: ICD-10-CM

## 2024-07-16 RX ORDER — LOSARTAN POTASSIUM 100 MG/1
100 TABLET ORAL DAILY
Qty: 90 TABLET | Refills: 2 | Status: SHIPPED | OUTPATIENT
Start: 2024-07-16

## 2024-07-17 ENCOUNTER — HOSPITAL ENCOUNTER (OUTPATIENT)
Dept: ULTRASOUND IMAGING | Facility: CLINIC | Age: 84
Discharge: HOME | End: 2024-07-17
Attending: FAMILY MEDICINE | Admitting: FAMILY MEDICINE
Payer: COMMERCIAL

## 2024-07-17 DIAGNOSIS — E04.1 THYROID NODULE: ICD-10-CM

## 2024-07-17 PROCEDURE — 88173 CYTOPATH EVAL FNA REPORT: CPT | Mod: TC

## 2024-07-17 PROCEDURE — 10005 FNA BX W/US GDN 1ST LES: CPT

## 2024-07-19 LAB
PATH REPORT.COMMENTS IMP SPEC: NORMAL
PATH REPORT.COMMENTS IMP SPEC: NORMAL
PATH REPORT.FINAL DX SPEC: NORMAL
PATH REPORT.GROSS SPEC: NORMAL
PATH REPORT.MICROSCOPIC SPEC OTHER STN: NORMAL

## 2024-07-19 PROCEDURE — 88173 CYTOPATH EVAL FNA REPORT: CPT | Mod: 26 | Performed by: PATHOLOGY

## 2024-07-19 PROCEDURE — 88172 CYTP DX EVAL FNA 1ST EA SITE: CPT | Mod: 26 | Performed by: PATHOLOGY

## 2024-07-30 ENCOUNTER — THERAPY VISIT (OUTPATIENT)
Dept: PHYSICAL THERAPY | Facility: REHABILITATION | Age: 84
End: 2024-07-30
Attending: PSYCHIATRY & NEUROLOGY
Payer: COMMERCIAL

## 2024-07-30 DIAGNOSIS — Z74.09 IMPAIRED FUNCTIONAL MOBILITY, BALANCE, GAIT, AND ENDURANCE: Primary | ICD-10-CM

## 2024-07-30 DIAGNOSIS — M47.812 CERVICAL SPONDYLOSIS WITHOUT MYELOPATHY: ICD-10-CM

## 2024-07-30 DIAGNOSIS — M48.02 CERVICAL STENOSIS OF SPINAL CANAL: ICD-10-CM

## 2024-07-30 PROCEDURE — 97161 PT EVAL LOW COMPLEX 20 MIN: CPT | Mod: GP

## 2024-07-30 PROCEDURE — 97110 THERAPEUTIC EXERCISES: CPT | Mod: GP

## 2024-07-30 NOTE — PROGRESS NOTES
PHYSICAL THERAPY EVALUATION  Type of Visit: Evaluation       Fall Risk Screen:  Have you fallen 2 or more times in the past year?: No  Have you fallen and had an injury in the past year?: No  Is patient a fall risk?: Yes    Gloria Drew presents to physical therapy to address a recent worsening of lower extremity strength/endurance, as well as decreased tolerance to standing. She said that she has been having difficulty standing straight, and she also gets winded very easily. Her referral is for cervical stenosis, however, she reports that her primary goal is to work on her gait, balance, and lower extremity strength and function. She also notes recent diagnosis of gout, which she wonders if it is related. She reports that she also was referred to physical therapy by her neurologist in 2015 for the same symptoms as now, which she attended for 3 months without any benefits. She reports that her symptoms started back in the late 1990s after an adverse reaction to a statin medication, and they have gotten progressively worse ever since then. She denies any pain, endorsing only weakness, fatigue, and balance and gait issues. She reports some occasional numbness in both feet that started around her gout diagnosis. She denies any saddle anesthesia, and she also denies any sudden or focal extremity weakness. She does report some constipation which she attributes to medications, and she also notes frequent urination during the night, but she denies any loss of bowel or bladder control.  Presenting condition or subjective complaint: Weakness in the back and legs, not pain.  Date of onset: 01/30/24    Relevant medical history: High blood pressure; Kidney disease; Overweight; Significant weakness   Dates & types of surgery: Cataracts  2016, detached retina 2017, wrist tendon 2018    Prior diagnostic imaging/testing results: MRI; EMG     Prior therapy history for the same diagnosis, illness or injury: Yes PT for three  months in 2015    Prior Level of Function  Transfers: Independent  Ambulation: Assistive equipment, cane prior to standard walker  ADL: Independent  IADL:  independent    Living Environment  Social support: Alone   Type of home: [facility]   Stairs to enter the home: No       Ramp: No   Stairs inside the home: Yes 14 Is there a railing: Yes     Help at home: None  Equipment owned: Straight Cane; Walker with wheels; Raised toilet seat     Employment: No    Hobbies/Interests: Painting,  photography, reading, cats, prior travel, TV, lunches with groups and individual friends    Patient goals for therapy: walk and stand without support and without shortness of breath    Pain assessment:  see subjective report     Objective      Cognitive Status Examination  Orientation: Oriented to person, place and time   Level of Consciousness: Alert  Follows Commands and Answers Questions: 100% of the time, Follows multi step instructions  Personal Safety and Judgement: Intact  Memory: Intact    OBSERVATION:   INTEGUMENTARY:   POSTURE:   PALPATION:   RANGE OF MOTION:   (Degrees) Left AROM Left PROM  Right AROM Right PROM   Hip Flexion       Hip Extension       Hip Abduction       Hip Adduction       Hip Internal Rotation       Hip External Rotation       Knee Flexion       Knee Extension Min limited  Min-mod limited    Lumbar Side glide     Lumbar Flexion    Lumbar Extension    Pain:   End feel:   STRENGTH:   Pain: - none + mild ++ moderate +++ severe  Strength Scale: 0-5/5 Left Right   Ankle Dorsiflexion 5 5   Ankle Plantarflexion 5 5   Ankle Inversion     Ankle Eversion     Great Toe Flexion     Great Toe Extension 5 5   Anterior Tibialis     Posterior Tibialis     Peroneals     Extensor Digitorum     Gastroc/Soleus       Pain: - none + mild ++ moderate +++ severe  Strength Scale: 0-5/5 Left Right   Hip Flexion 4- 4-   Hip Extension     Hip Abduction 5 5   Hip Adduction 4+ 4+   Hip Internal Rotation     Hip External Rotation     Knee  Flexion 4+ 4+   Knee Extension 5 5       BED MOBILITY:     TRANSFERS:     WHEELCHAIR MOBILITY:     GAIT:   Level of Garvin:  modified independent  Assistive Device(s): Walker (front wheeled)  Gait Deviations: Emma decreased  Gait Distance:   Stairs:     BALANCE:     SPECIAL TESTS  Functional Gait Assessment (FGA)      10 Meter Walk Test (Comfortable)     10 Meter Walk Test (Fast)     6 Minute Walk Test (6MWT)           Dee Balance Scale (BBS)     5 Times Sit-to-Stand (5TSTS)  Unable; 33.67 seconds with bilateral upper extremity assist     Dynamic Gait Index (DGI)     Timed Up and Go (TUG) - sec 23.14 seconds (front-wheeled walker)   Single Leg Stance Right (sec)    Single Leg Stance Left (sec)    Modified CTSIB Conditions (sec) Cond 1:   Cond 2:   Cond 4:   Cond 5 :    Romberg  (sec)    Sharpened Romberg (sec)    30 Second Sit to Stand (reps/height)    Mini-BESTest              SENSATION:   LE Sensory Exam Left LE Right LE   Light Touch Decreased at lateral proximal calf Decreased at lateral proximal calf   Pain     Proprioception     Sharp/Dull Discrimination     Temperature     Two Point Discrimination         REFLEXES:   COORDINATION:   MUSCLE TONE:       LUMBAR SPINE EVALUATION  PAIN:   INTEGUMENTARY (edema, incisions):   POSTURE:   GAIT:   Weightbearing Status:   Assistive Device(s):   Gait Deviations:   BALANCE/PROPRIOCEPTION:   WEIGHTBEARING ALIGNMENT: Foot/Ankle WB Alignment:Calcaneal valgus L, Calcaneal valgus R  Knee WB Alignment:Genu valgus L, Genu valgus R  NON-WEIGHTBEARING ALIGNMENT:    ROM:   PELVIC/SI SCREEN:   STRENGTH:     MYOTOMES:   DTR S:   CORD SIGNS:   DERMATOMES:    Left Right   T12      L1     L2 Normal (light touch) Normal (light touch)   L3 Normal (light touch) Normal (light touch)   L4 Normal (light touch) Normal (light touch)   L5 Normal (light touch) Normal (light touch)   S1 Normal (light touch) Normal (light touch)     NEURAL TENSION:   FLEXIBILITY:   LUMBAR/HIP Special  Tests:    PELVIS/SI SPECIAL TESTS:   FUNCTIONAL TESTS:   PALPATION:   SPINAL SEGMENTAL CONCLUSIONS:     CERVICAL SPINE EVALUATION  PAIN:   INTEGUMENTARY (edema, incisions):   POSTURE:   GAIT:   Weightbearing Status:   Assistive Device(s):   Gait Deviations:   BALANCE/PROPRIOCEPTION:   WEIGHTBEARING ALIGNMENT:   ROM:   (Degrees) Left AROM Right AROM    Cervical Flexion WFL    Cervical Extension 25%    Cervical Side bend 25% to 50% 0% to 25%    Cervical Rotation 50% to 75% 50% to 75%    Cervical Protrusion     Cervical Retraction     Thoracic Flexion     Thoracic Extension     Thoracic Rotation       Left AROM Left PROM Right AROM Right PROM   Shoulder Flexion       Shoulder Extension       Shoulder Abduction       Shoulder Adduction       Shoulder IR       Shoulder ER       Shoulder Horiz Abduction       Shoulder Horiz Adduction       Pain:   End Feel:     MYOTOMES:   DTR S:   CORD SIGNS:   DERMATOMES:   NEURAL TENSION:   FLEXIBILITY:    SPECIAL TESTS:   PALPATION:   SPINAL SEGMENTAL CONCLUSIONS:       Assessment & Plan   CLINICAL IMPRESSIONS  Medical Diagnosis: Cervical spondylosis without myelopathy  Cervical stenosis of spinal canal    Treatment Diagnosis: Impaired functional mobility, balance, gait, and endurance with generalized weakness   Impression/Assessment: Patient is a 84 year old female with impaired functional mobility, balance, gait, and endurance.  The following significant findings have been identified: Decreased ROM/flexibility, Decreased strength, Impaired balance, Impaired gait, Impaired muscle performance, Decreased activity tolerance, Impaired posture, and Instability. These impairments interfere with their ability to perform self care tasks, recreational activities, household chores, household mobility, and community mobility as compared to previous level of function.     Clinical Decision Making (Complexity):  Clinical Presentation: Evolving/Changing  Clinical Presentation Rationale: based on  medical and personal factors listed in PT evaluation  Clinical Decision Making (Complexity): Low complexity    PLAN OF CARE  Treatment Interventions:  Interventions: Gait Training, Manual Therapy, Neuromuscular Re-education, Therapeutic Activity, Therapeutic Exercise, Self-Care/Home Management, Standardized Testing    Long Term Goals     PT Goal 1  Goal Identifier: HEP  Goal Description: Viki will demonstrate mastery of ([id number]-lb-pm need for cueing) and compliance with (completion at least 5/7 days/week) her home exercise program to facilitate increased rate of improvement.  Goal Progress: In progress  Target Date: 08/27/24  PT Goal 2  Goal Identifier: TUG  Goal Description: Viki demonstrated improved functional mobility and a decreased fall risk as evidenced by an improved (decreased) TUG time of less than or equal to 15 seconds.  Goal Progress: 23.14 seconds (front-wheeled walker)  Target Date: 10/08/24  PT Goal 3  Goal Identifier: 5-time Sit-to-stand  Goal Description: Viki demonstrate improved tolerance to functional transfers and increased lower extremity strength as evidenced by an improved (decreased) 5-time sit-to-stand time of less than or equal to 20 seconds with or without use of upper extremities.  Goal Progress: Unable; 33.67 seconds with bilateral upper extremity assist  Target Date: 10/08/24  PT Goal 4  Goal Identifier: FGA  Goal Description: Will assess at next visit.  Goal Progress: Will assess at next visit.  Target Date: 10/08/24      Frequency of Treatment: 1 time per week  Duration of Treatment: 10 weeks    Recommended Referrals to Other Professionals:  none  Education Assessment:   Learner/Method: Patient;Listening;Demonstration;Pictures/Video;No Barriers to Learning    Risks and benefits of evaluation/treatment have been explained.   Patient/Family/caregiver agrees with Plan of Care.     Evaluation Time:     PT Eval, Low Complexity Minutes (36933): 31       Signing Clinician: Marcos MARSHALL  Kameron, PT        [facility]                                                                                   OUTPATIENT PHYSICAL THERAPY      PLAN OF TREATMENT FOR OUTPATIENT REHABILITATION   Patient's Last Name, First Name, Viki Ayala YOB: 1940   Provider's Name   [facility]   Medical Record No.  [id number]     Onset Date: 01/30/24  Start of Care Date: 07/30/24     Medical Diagnosis:  Cervical spondylosis without myelopathy  Cervical stenosis of spinal canal      PT Treatment Diagnosis:  Impaired functional mobility, balance, gait, and endurance with generalized weakness Plan of Treatment  Frequency/Duration: 1 time per week/ 10 weeks    Certification date from 07/30/24 to 10/08/24         See note for plan of treatment details and functional goals     Marcos Melo, PT                         I CERTIFY THE NEED FOR THESE SERVICES FURNISHED UNDER        THIS PLAN OF TREATMENT AND WHILE UNDER MY CARE     (Physician attestation of this document indicates review and certification of the therapy plan).              Referring Provider:  Rigoberto Lund    Initial Assessment  See Epic Evaluation- Start of Care Date: 07/30/24

## 2024-08-05 NOTE — TELEPHONE ENCOUNTER
RECORDS STATUS - ALL OTHER DIAGNOSIS      RECORDS RECEIVED FROM: [facility]   NOTES STATUS DETAILS   OFFICE NOTE from referring provider Epic 07/03/2: Dr. Rigoberto Lund   MEDICATION LIST [facility]    LABS     PATHOLOGY REPORTS     ANYTHING RELATED TO DIAGNOSIS EPic Most recent 07/17/24

## 2024-08-06 ENCOUNTER — THERAPY VISIT (OUTPATIENT)
Dept: PHYSICAL THERAPY | Facility: REHABILITATION | Age: 84
End: 2024-08-06
Payer: COMMERCIAL

## 2024-08-06 DIAGNOSIS — M47.812 CERVICAL SPONDYLOSIS WITHOUT MYELOPATHY: Primary | ICD-10-CM

## 2024-08-06 DIAGNOSIS — M48.02 CERVICAL STENOSIS OF SPINAL CANAL: ICD-10-CM

## 2024-08-06 DIAGNOSIS — Z74.09 IMPAIRED FUNCTIONAL MOBILITY, BALANCE, GAIT, AND ENDURANCE: ICD-10-CM

## 2024-08-06 PROCEDURE — 97535 SELF CARE MNGMENT TRAINING: CPT | Mod: GP

## 2024-08-06 PROCEDURE — 97110 THERAPEUTIC EXERCISES: CPT | Mod: GP

## 2024-08-06 PROCEDURE — 97112 NEUROMUSCULAR REEDUCATION: CPT | Mod: GP

## 2024-08-13 ENCOUNTER — THERAPY VISIT (OUTPATIENT)
Dept: PHYSICAL THERAPY | Facility: REHABILITATION | Age: 84
End: 2024-08-13
Payer: COMMERCIAL

## 2024-08-13 DIAGNOSIS — M47.812 CERVICAL SPONDYLOSIS WITHOUT MYELOPATHY: ICD-10-CM

## 2024-08-13 DIAGNOSIS — Z74.09 IMPAIRED FUNCTIONAL MOBILITY, BALANCE, GAIT, AND ENDURANCE: Primary | ICD-10-CM

## 2024-08-13 DIAGNOSIS — M48.02 CERVICAL STENOSIS OF SPINAL CANAL: ICD-10-CM

## 2024-08-13 PROCEDURE — 97750 PHYSICAL PERFORMANCE TEST: CPT | Mod: GP

## 2024-08-13 PROCEDURE — 97112 NEUROMUSCULAR REEDUCATION: CPT | Mod: GP

## 2024-08-13 NOTE — PROGRESS NOTES
PLAN  Continue therapy per current plan of care.    Beginning/End Dates of Progress Note Reporting Period:  07/30/2024 to 08/13/2024    Referring Provider:  Rigoberto Lund           08/13/24 0500   Appointment Info   Signing clinician's name / credentials Marcos Melo, PT   Visits Used 3   Medical Diagnosis Cervical spondylosis without myelopathy  Cervical stenosis of spinal canal   PT Tx Diagnosis Impaired functional mobility, balance, gait, and endurance with generalized weakness   Progress Note/Certification   Start of Care Date 07/30/24   Onset of illness/injury or Date of Surgery 01/30/24   Therapy Frequency 1 time per week   Predicted Duration 10 weeks   Certification date from 07/30/24   Certification date to 10/08/24   Progress Note Due Date 08/27/24   PT Goal 1   Goal Identifier HEP   Goal Description Viki will demonstrate mastery of (ljreuo-ev-im need for cueing) and compliance with (completion at least 5/7 days/week) her home exercise program to facilitate increased rate of improvement.   Goal Progress In progress   Target Date 08/27/24   PT Goal 2   Goal Identifier TUG   Goal Description Viki demonstrated improved functional mobility and a decreased fall risk as evidenced by an improved (decreased) TUG time of less than or equal to 15 seconds.   Goal Progress 23.14 seconds (front-wheeled walker)   Target Date 10/08/24   PT Goal 3   Goal Identifier 5-time Sit-to-stand   Goal Description Viki demonstrate improved tolerance to functional transfers and increased lower extremity strength as evidenced by an improved (decreased) 5-time sit-to-stand time of less than or equal to 20 seconds with or without use of upper extremities.   Goal Progress Unable; 33.67 seconds with bilateral upper extremity assist   Target Date 10/08/24   PT Goal 4   Goal Identifier FGA   Goal Description Will assess at next visit.   Goal Progress 17/30 with front-wheeled walker. 8/30 without assistive device.   Target Date  10/08/24   Subjective Report   Subjective Report Viki reports that she had moved some furniture recently, and when she got up during the night to go to the bathroom she tripped and fell on a chair that she had moved. She denies any notable injury (minimal bruising on right forearm) or head impact. She thinks it's because she hadn't let her eyes adjust back to the dark after turning her bathroom light off. She does, however, note resolution of her right knee pain.   Objective Measures   Objective Measures Objective Measure 1;Objective Measure 2;Objective Measure 3;Objective Measure 4   Objective Measure 1   Objective Measure TUG   Details Initial evaluation: 23.14 seconds (front-wheeled walker)   Objective Measure 2   Objective Measure 5-time Sit-to-stand   Details Initial evaluation: Overall demonstrate improved tolerance to functional transfers and increased lower extremity strength as evidenced by an improved (decreased) 5-time sit-to-stand time of less than or equal to 20 seconds with or without use of upper extremities.   Objective Measure 3   Objective Measure FGA   Details 17/30 with front-wheeled walker. 8/30 without assistive device.   Treatment Interventions (PT)   Interventions Therapeutic Procedure/Exercise   Neuromuscular Re-education   Neuromuscular re-ed of mvmt, balance, coord, kinesthetic sense, posture, proprioception minutes (02156) 13   Neuro Re-ed 1 Neutral spine slouch overcorrect   Neuro Re-ed 1 - Details 1 x 15 (frequent cueing to avoid trunk lean)   Neuro Re-ed 2 Scapular retraction with depression   Neuro Re-ed 2 - Details 1 x 15 (significant verbal and tactile cueing throughout to avoid trunk extension and shoulder elevation)   Neuro Re-ed 3 Cervical retraction   Neuro Re-ed 3 - Details 1 x 15 (continues to require significant cueing to avoid lower cervical flexion/extension and trunk extension)   Skilled Intervention Exercises to increase postural awareness/control/endurance   Patient  Response/Progress Viki tolerated all exercises well, however she has frequent questions and wants to discuss in detail the purpose and rationale with each exercise.   Neuro Re-ed 4 Subjective report   Neuro Re-ed 4 - Details See subjective report.   Physical Performance Test/measures   Physical Performance Test/Measurement, Minutes (10579) 25   Skilled Intervention FGA x 2 + setup/explanation + discussion of results.   Patient Response/Progress See objective measures.   Education   Learner/Method Patient;Listening;Demonstration;Pictures/Video;No Barriers to Learning   Plan   Home program See PTRx.   Plan for next session Progress lower extremity strength and endurance exercises as tolerated.  Progress postural awareness/control exercises. Introduce core strength/stability exercises.  Introduce balance and gait training.   Comments   Comments Impression/Assessment: Viki returns to physical therapy reporting a fall since last visit. She had recently moved some furniture, and when she got up to go to the bathroom during the night she did not let her eyes adjust back to the dark after turning the light off and she tripped over one of the chair she had moved.  She had a slight bruise on her right forearm, but otherwise she denies injury or head impact.  She reports resolution of right knee pain reported at last visit, so functional gait assessment testing was performed today.  She initially scored a 17/30 with use of front wheeled walker, and then she requested to perform the test again without use of assistive device.  Without assistive device she scored an 8/30.  Both scores indicate an increased risk for falls, and gait and balance training will be initiated at future visits.  Return to lower extremity strength/endurance training will also be performed at future visits given resolution of right knee pain.  She remains motivated to participate and will continue to benefit from physical therapy to improve her function  and tolerance to activity.   Total Session Time   Timed Code Treatment Minutes 38   Total Treatment Time (sum of timed and untimed services) 38

## 2024-08-20 ENCOUNTER — THERAPY VISIT (OUTPATIENT)
Dept: PHYSICAL THERAPY | Facility: REHABILITATION | Age: 84
End: 2024-08-20
Payer: COMMERCIAL

## 2024-08-20 DIAGNOSIS — M47.812 CERVICAL SPONDYLOSIS WITHOUT MYELOPATHY: ICD-10-CM

## 2024-08-20 DIAGNOSIS — Z74.09 IMPAIRED FUNCTIONAL MOBILITY, BALANCE, GAIT, AND ENDURANCE: Primary | ICD-10-CM

## 2024-08-20 PROCEDURE — 97110 THERAPEUTIC EXERCISES: CPT | Mod: GP

## 2024-08-20 PROCEDURE — 97112 NEUROMUSCULAR REEDUCATION: CPT | Mod: GP

## 2024-08-22 ENCOUNTER — PRE VISIT (OUTPATIENT)
Dept: ONCOLOGY | Facility: HOSPITAL | Age: 84
End: 2024-08-22
Payer: COMMERCIAL

## 2024-08-22 ENCOUNTER — HOSPITAL ENCOUNTER (OUTPATIENT)
Dept: RADIOLOGY | Facility: CLINIC | Age: 84
Discharge: HOME | End: 2024-08-22
Attending: INTERNAL MEDICINE | Admitting: INTERNAL MEDICINE
Payer: COMMERCIAL

## 2024-08-22 ENCOUNTER — ONCOLOGY VISIT (OUTPATIENT)
Dept: ONCOLOGY | Facility: HOSPITAL | Age: 84
End: 2024-08-22
Attending: PSYCHIATRY & NEUROLOGY
Payer: COMMERCIAL

## 2024-08-22 ENCOUNTER — LAB (OUTPATIENT)
Dept: INFUSION THERAPY | Facility: HOSPITAL | Age: 84
End: 2024-08-22
Attending: INTERNAL MEDICINE
Payer: COMMERCIAL

## 2024-08-22 VITALS
OXYGEN SATURATION: 99 % | SYSTOLIC BLOOD PRESSURE: 179 MMHG | HEIGHT: 68 IN | RESPIRATION RATE: 16 BRPM | BODY MASS INDEX: 29.98 KG/M2 | TEMPERATURE: 97.9 F | DIASTOLIC BLOOD PRESSURE: 89 MMHG | HEART RATE: 79 BPM | WEIGHT: 197.8 LBS

## 2024-08-22 DIAGNOSIS — G60.8 POLYNEUROPATHY, PERIPHERAL SENSORIMOTOR AXONAL: ICD-10-CM

## 2024-08-22 DIAGNOSIS — D47.2 MGUS (MONOCLONAL GAMMOPATHY OF UNKNOWN SIGNIFICANCE): ICD-10-CM

## 2024-08-22 LAB
ALBUMIN SERPL BCG-MCNC: 4 G/DL (ref 3.5–5.2)
ALP SERPL-CCNC: 93 U/L (ref 40–150)
ALT SERPL W P-5'-P-CCNC: 20 U/L (ref 0–50)
ANION GAP SERPL CALCULATED.3IONS-SCNC: 14 MMOL/L (ref 7–15)
AST SERPL W P-5'-P-CCNC: 24 U/L (ref 0–45)
BASOPHILS # BLD AUTO: 0.1 10E3/UL (ref 0–0.2)
BASOPHILS NFR BLD AUTO: 1 %
BILIRUB SERPL-MCNC: 0.4 MG/DL
BUN SERPL-MCNC: 14.2 MG/DL (ref 8–23)
CALCIUM SERPL-MCNC: 9.1 MG/DL (ref 8.8–10.4)
CHLORIDE SERPL-SCNC: 108 MMOL/L (ref 98–107)
CREAT SERPL-MCNC: 1.16 MG/DL (ref 0.51–0.95)
EGFRCR SERPLBLD CKD-EPI 2021: 47 ML/MIN/1.73M2
EOSINOPHIL # BLD AUTO: 0.3 10E3/UL (ref 0–0.7)
EOSINOPHIL NFR BLD AUTO: 3 %
ERYTHROCYTE [DISTWIDTH] IN BLOOD BY AUTOMATED COUNT: 13.5 % (ref 10–15)
GLUCOSE SERPL-MCNC: 128 MG/DL (ref 70–99)
HCO3 SERPL-SCNC: 23 MMOL/L (ref 22–29)
HCT VFR BLD AUTO: 40.7 % (ref 35–47)
HGB BLD-MCNC: 13.7 G/DL (ref 11.7–15.7)
IMM GRANULOCYTES # BLD: 0 10E3/UL
IMM GRANULOCYTES NFR BLD: 0 %
LYMPHOCYTES # BLD AUTO: 2.5 10E3/UL (ref 0.8–5.3)
LYMPHOCYTES NFR BLD AUTO: 30 %
MCH RBC QN AUTO: 31.7 PG (ref 26.5–33)
MCHC RBC AUTO-ENTMCNC: 33.7 G/DL (ref 31.5–36.5)
MCV RBC AUTO: 94 FL (ref 78–100)
MONOCYTES # BLD AUTO: 0.7 10E3/UL (ref 0–1.3)
MONOCYTES NFR BLD AUTO: 8 %
NEUTROPHILS # BLD AUTO: 4.9 10E3/UL (ref 1.6–8.3)
NEUTROPHILS NFR BLD AUTO: 58 %
NRBC # BLD AUTO: 0 10E3/UL
NRBC BLD AUTO-RTO: 0 /100
PLATELET # BLD AUTO: 230 10E3/UL (ref 150–450)
POTASSIUM SERPL-SCNC: 4.3 MMOL/L (ref 3.4–5.3)
PROT SERPL-MCNC: 7 G/DL (ref 6.4–8.3)
RBC # BLD AUTO: 4.32 10E6/UL (ref 3.8–5.2)
SODIUM SERPL-SCNC: 145 MMOL/L (ref 135–145)
TOTAL PROTEIN SERUM FOR ELP: 6.6 G/DL (ref 6.4–8.3)
WBC # BLD AUTO: 8.5 10E3/UL (ref 4–11)

## 2024-08-22 PROCEDURE — 84155 ASSAY OF PROTEIN SERUM: CPT | Performed by: INTERNAL MEDICINE

## 2024-08-22 PROCEDURE — 36415 COLL VENOUS BLD VENIPUNCTURE: CPT | Performed by: INTERNAL MEDICINE

## 2024-08-22 PROCEDURE — 85004 AUTOMATED DIFF WBC COUNT: CPT | Performed by: INTERNAL MEDICINE

## 2024-08-22 PROCEDURE — G0463 HOSPITAL OUTPT CLINIC VISIT: HCPCS | Performed by: INTERNAL MEDICINE

## 2024-08-22 PROCEDURE — 82784 ASSAY IGA/IGD/IGG/IGM EACH: CPT | Performed by: INTERNAL MEDICINE

## 2024-08-22 PROCEDURE — 83521 IG LIGHT CHAINS FREE EACH: CPT | Mod: 59 | Performed by: INTERNAL MEDICINE

## 2024-08-22 PROCEDURE — G2211 COMPLEX E/M VISIT ADD ON: HCPCS | Performed by: INTERNAL MEDICINE

## 2024-08-22 PROCEDURE — 82040 ASSAY OF SERUM ALBUMIN: CPT | Performed by: INTERNAL MEDICINE

## 2024-08-22 PROCEDURE — 84165 PROTEIN E-PHORESIS SERUM: CPT | Mod: TC | Performed by: PATHOLOGY

## 2024-08-22 PROCEDURE — 77075 RADEX OSSEOUS SURVEY COMPL: CPT

## 2024-08-22 PROCEDURE — 86335 IMMUNFIX E-PHORSIS/URINE/CSF: CPT | Performed by: PATHOLOGY

## 2024-08-22 PROCEDURE — 84166 PROTEIN E-PHORESIS/URINE/CSF: CPT | Performed by: PATHOLOGY

## 2024-08-22 PROCEDURE — 86334 IMMUNOFIX E-PHORESIS SERUM: CPT | Performed by: PATHOLOGY

## 2024-08-22 PROCEDURE — 99204 OFFICE O/P NEW MOD 45 MIN: CPT | Performed by: INTERNAL MEDICINE

## 2024-08-22 ASSESSMENT — PAIN SCALES - GENERAL: PAINLEVEL: NO PAIN (0)

## 2024-08-22 NOTE — PROGRESS NOTES
"Oncology Rooming Note    August 22, 2024 2:05 PM   Viki Robert is a 84 year old female who presents for:    Chief Complaint   Patient presents with    Hematology     New Consultation. Monoclonal gammopathy of unknown significance.      Initial Vitals: BP (!) 179/89 (Patient Position: Sitting)   Pulse 79   Temp 97.9  F (36.6  C) (Oral)   Resp 16   Ht 1.727 m (5' 8\")   Wt 89.7 kg (197 lb 12.8 oz)   SpO2 99%   BMI 30.08 kg/m   Estimated body mass index is 30.08 kg/m  as calculated from the following:    Height as of this encounter: 1.727 m (5' 8\").    Weight as of this encounter: 89.7 kg (197 lb 12.8 oz). Body surface area is 2.07 meters squared.  No Pain (0) Comment: Data Unavailable   No LMP recorded.  Allergies reviewed: No  Medications reviewed: Yes    Medications: Medication refills not needed today.  Pharmacy name entered into Sir[employer]:    [id number] [address]  AT [id number] - [place], [place] - [address]    Frailty Screening:   Is the patient here for a new oncology consult visit in cancer care? 1. Yes. Over the past month, have you experienced difficulty or required a caregiver to assist with:   1. Balance, walking or general mobility (including any falls)? NO  2. Completion of self-care tasks such as bathing, dressing, toileting, grooming/hygiene?  NO  3. Concentration or memory that affects your daily life?  NO       Clinical concerns:  New Consultation.       Nasreen Abraham LPN            "

## 2024-08-22 NOTE — Clinical Note
"8/22/2024      Viki Robert  [address] [place] [address]      Dear Colleague,    Thank you for referring your patient, Viki Robert, to the [facility]. Please see a copy of my visit note below.    Oncology Rooming Note    August 22, 2024 2:05 PM   Viki Robert is a 84 year old female who presents for:    Chief Complaint   Patient presents with    Hematology     New Consultation. Monoclonal gammopathy of unknown significance.      Initial Vitals: BP (!) 179/89 (Patient Position: Sitting)   Pulse 79   Temp 97.9  F (36.6  C) (Oral)   Resp 16   Ht 1.727 m (5' 8\")   Wt 89.7 kg (197 lb 12.8 oz)   SpO2 99%   BMI 30.08 kg/m   Estimated body mass index is 30.08 kg/m  as calculated from the following:    Height as of this encounter: 1.727 m (5' 8\").    Weight as of this encounter: 89.7 kg (197 lb 12.8 oz). Body surface area is 2.07 meters squared.  No Pain (0) Comment: Data Unavailable   No LMP recorded.  Allergies reviewed: No  Medications reviewed: Yes    Medications: Medication refills not needed today.  Pharmacy name entered into Sharegate:    [facility] #[id number] [facility] [address]  AT [id number] [place] - [address]    Frailty Screening:   Is the patient here for a new oncology consult visit in cancer care? 1. Yes. Over the past month, have you experienced difficulty or required a caregiver to assist with:   1. Balance, walking or general mobility (including any falls)? NO  2. Completion of self-care tasks such as bathing, dressing, toileting, grooming/hygiene?  NO  3. Concentration or memory that affects your daily life?  NO       Clinical concerns:  New Consultation.       Nasreen Abraham LPN                Again, thank you for allowing me to participate in the care of your patient.        Sincerely,        Monica Urias MD  "

## 2024-08-23 LAB
ALBUMIN SERPL ELPH-MCNC: 3.8 G/DL (ref 3.7–5.1)
ALPHA1 GLOB SERPL ELPH-MCNC: 0.2 G/DL (ref 0.2–0.4)
ALPHA2 GLOB SERPL ELPH-MCNC: 1 G/DL (ref 0.5–0.9)
B-GLOBULIN SERPL ELPH-MCNC: 0.9 G/DL (ref 0.6–1)
GAMMA GLOB SERPL ELPH-MCNC: 0.7 G/DL (ref 0.7–1.6)
IGA SERPL-MCNC: 273 MG/DL (ref 84–499)
IGG SERPL-MCNC: 737 MG/DL (ref 610–1616)
IGM SERPL-MCNC: 79 MG/DL (ref 35–242)
KAPPA LC FREE SER-MCNC: 3 MG/DL (ref 0.33–1.94)
KAPPA LC FREE/LAMBDA FREE SER NEPH: 1.85 {RATIO} (ref 0.26–1.65)
LAMBDA LC FREE SERPL-MCNC: 1.62 MG/DL (ref 0.57–2.63)
M PROTEIN SERPL ELPH-MCNC: 0 G/DL
PROT ELPH PNL UR ELPH: NORMAL
PROT PATTERN SERPL ELPH-IMP: ABNORMAL
PROT PATTERN SERPL IFE-IMP: NORMAL
PROT PATTERN UR ELPH-IMP: NORMAL

## 2024-08-23 PROCEDURE — 86335 IMMUNFIX E-PHORSIS/URINE/CSF: CPT | Mod: 26 | Performed by: PATHOLOGY

## 2024-08-23 PROCEDURE — 84165 PROTEIN E-PHORESIS SERUM: CPT | Mod: 26 | Performed by: PATHOLOGY

## 2024-08-23 PROCEDURE — 84166 PROTEIN E-PHORESIS/URINE/CSF: CPT | Mod: 26 | Performed by: PATHOLOGY

## 2024-08-23 PROCEDURE — 86334 IMMUNOFIX E-PHORESIS SERUM: CPT | Mod: 26 | Performed by: PATHOLOGY

## 2024-08-28 ENCOUNTER — PATIENT OUTREACH (OUTPATIENT)
Dept: ONCOLOGY | Facility: HOSPITAL | Age: 84
End: 2024-08-28
Payer: COMMERCIAL

## 2024-08-28 DIAGNOSIS — D47.2 MGUS (MONOCLONAL GAMMOPATHY OF UNKNOWN SIGNIFICANCE): Primary | ICD-10-CM

## 2024-08-28 NOTE — PROGRESS NOTES
[facility]: Cancer Care Follow-Up Note                                    Discussion with Patient:                                                      Dr. 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   No bone scan or further work up needed at this time.  Patient should follow up in 1 yr with labs week before.     Dates of Treatment:                                                      Infusion given in last 28 days       None            Assessment:                                                      Refer to lab results from 8/22/24.    Intervention/Education provided during outreach:                                                       Called patient with results and plan.  She verbalized understanding. Told her we will add her to the recall list and our Schedulers will call her when it gets closer to her appointment time. Patient verbalized understanding.    Message to schedulers to add to recall list with labs week before.    Patient to follow up as scheduled at next appt    Signature:  Shayla Kamara RN

## 2024-08-29 ENCOUNTER — THERAPY VISIT (OUTPATIENT)
Dept: PHYSICAL THERAPY | Facility: REHABILITATION | Age: 84
End: 2024-08-29
Payer: COMMERCIAL

## 2024-08-29 DIAGNOSIS — Z74.09 IMPAIRED FUNCTIONAL MOBILITY, BALANCE, GAIT, AND ENDURANCE: Primary | ICD-10-CM

## 2024-08-29 DIAGNOSIS — M48.02 CERVICAL STENOSIS OF SPINAL CANAL: ICD-10-CM

## 2024-08-29 DIAGNOSIS — M47.812 CERVICAL SPONDYLOSIS WITHOUT MYELOPATHY: ICD-10-CM

## 2024-08-29 PROCEDURE — 97110 THERAPEUTIC EXERCISES: CPT | Mod: GP | Performed by: PHYSICAL THERAPIST

## 2024-09-03 ENCOUNTER — THERAPY VISIT (OUTPATIENT)
Dept: PHYSICAL THERAPY | Facility: REHABILITATION | Age: 84
End: 2024-09-03
Payer: COMMERCIAL

## 2024-09-03 DIAGNOSIS — M47.812 CERVICAL SPONDYLOSIS WITHOUT MYELOPATHY: ICD-10-CM

## 2024-09-03 DIAGNOSIS — Z74.09 IMPAIRED FUNCTIONAL MOBILITY, BALANCE, GAIT, AND ENDURANCE: Primary | ICD-10-CM

## 2024-09-03 DIAGNOSIS — M48.02 CERVICAL STENOSIS OF SPINAL CANAL: ICD-10-CM

## 2024-09-03 PROCEDURE — 97535 SELF CARE MNGMENT TRAINING: CPT | Mod: GP

## 2024-09-03 PROCEDURE — 97110 THERAPEUTIC EXERCISES: CPT | Mod: GP

## 2024-09-03 PROCEDURE — 97112 NEUROMUSCULAR REEDUCATION: CPT | Mod: GP

## 2024-09-05 ENCOUNTER — LAB (OUTPATIENT)
Dept: CARDIOLOGY | Facility: CLINIC | Age: 84
End: 2024-09-05
Payer: COMMERCIAL

## 2024-09-05 DIAGNOSIS — E78.5 HYPERLIPIDEMIA LDL GOAL <100: ICD-10-CM

## 2024-09-05 LAB
CHOLEST SERPL-MCNC: 265 MG/DL
FASTING STATUS PATIENT QL REPORTED: YES
HDLC SERPL-MCNC: 38 MG/DL
LDLC SERPL CALC-MCNC: 165 MG/DL
NONHDLC SERPL-MCNC: 227 MG/DL
TRIGL SERPL-MCNC: 309 MG/DL

## 2024-09-05 PROCEDURE — 80061 LIPID PANEL: CPT

## 2024-09-05 PROCEDURE — 36415 COLL VENOUS BLD VENIPUNCTURE: CPT

## 2024-09-09 NOTE — PROGRESS NOTES
[facility] Consult Note    Patient: Viki Robert  MRN: [id number]  Date of Service: Aug 22, 2024       Reason for Visit    Chief Complaint   Patient presents with    Hematology     New Consultation. Monoclonal gammopathy of unknown significance.          Assessment/Plan    ECOG Performance 0 - Independent    #.  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  Chronic peripheral neuropathy     I reviewed her labs in detail.  Her CBC showed no anemia.  Normal WBC and platelets.  Complete metabolic profile was entirely unremarkable with normal calcium and renal function.  Plasma proteins showed very faint monoclonal gammopathy of IgM lambda type but no SPEP was performed.  I recommended additional workup of complete myeloma panel and skeletal survey.  The monoclonal gammopathy was identified as fate by immunofixation only, therefore there is likely may not have measurable monoclonal protein.    I discussed about the biology of plasma cell disease and spectrum of presentation, risk of progression.  I do not believe her peripheral neuropathy is related to this monoclonal gammopathy.   I recommended additional labs and imaging as below.  I will follow-up once the results are available.       8/28/2024  No measurable monoclonal protein, however faint IgM lambda monoclonal gammopathy was identified by immunofixation.  No labs and imaging finding consistent with myeloma defining illness.  Waldenström macroglobulinemia is a consideration based on the subtype of monoclonal gammopathy.  I do not think additional workup is indicated at this point since it does not have any clinical significance.      Recommended continue clinical and laboratory surveillance. Follow-up labs and provider visit in 1 year with labs about a week prior.  She is advised to call me sooner with any concerns.    Encounter Diagnoses:    Problem List Items Addressed This Visit          Oncology  Diagnoses    MGUS (monoclonal gammopathy of unknown significance)    Relevant Orders    CBC with Platelets & Differential (Completed)    Comprehensive metabolic panel (Completed)    Protein electrophoresis (Completed)    Protein immunofixation urine (Completed)    Immunoglobulins A G and M (Completed)    Protein electrophoresis random urine (Completed)    Protein Immunofixation Serum (Completed)    Kappa and lambda light chain (Completed)    XR Bone Survey Complete (Completed)    CBC with Platelets & Differential    Comprehensive metabolic panel    Protein electrophoresis    Immunoglobulins A G and M    Protein Immunofixation Serum    Kappa and lambda light chain       Other    Polyneuropathy, peripheral sensorimotor axonal       ______________________________________________________________________________    Staging History   Cancer Staging   No matching staging information was found for the patient.        History    Ms. Viki Robert is a very pleasant 84 year old female presented today for further evaluation of abnormal plasma protein result.    She was found abnormal plasma protein level upon evaluation of neuropathy for 10 years.  She does not have any bone pain, drenching sweats, fever, palpable masses.  She does not have history of cancer.  She lives alone.  She has.  She does not smoke.  She does not drink alcohol.  She does not use recreational drugs.  Family history is significant for multiple myeloma in her father diagnosed at age 75 and  from it at age 78.  Her mother and maternal grandmother had breast cancer and they are 90s-100s.    Review of systems.  Apart from describing in history, the remainder of comprehensive ROS was negative.    Past History    No past medical history on file.  Past Surgical History:   Procedure Laterality Date    BIOPSY BREAST Left      Family History   Problem Relation Age of Onset    Breast Cancer Mother 90.00    Multiple myeloma Father 75.00    Breast Cancer  "Paternal Grandmother 100.00     Social History     Socioeconomic History    Marital status: Single     Spouse name: None    Number of children: None    Years of education: None    Highest education level: None   Tobacco Use    Smoking status: Never    Smokeless tobacco: Never   Vaping Use    Vaping status: Never Used   Substance and Sexual Activity    Drug use: Never     Social Determinants of Health      [facility] [employer] [facility]    Financial Resource Strain    [facility], [employer] [facility]    Social Connections       Allergies    Allergies   Allergen Reactions    Atorvastatin Unknown     Achy & weak    Hydrochlorothiazide Unknown     Light headed    Lodine [Etodolac] Unknown    Naprosyn [Naproxen] Unknown     Ankle swelling    Pravachol [Pravastatin] Unknown     Muscle aches & weakness    Statins [Statins] Muscle Pain (Myalgia)       Physical Exam    BP (!) 179/89 (Patient Position: Sitting)   Pulse 79   Temp 97.9  F (36.6  C) (Oral)   Resp 16   Ht 1.727 m (5' 8\")   Wt 89.7 kg (197 lb 12.8 oz)   SpO2 99%   BMI 30.08 kg/m      General: alert, awake, not in acute distress  HEENT: Head: Normal, normocephalic, atraumatic.  Eye: Normal external eye, conjunctiva, lids cornea, DENA.  Nose: Normal external nose, mucus membranes and septum.  Pharynx: Normal buccal mucosa. Normal pharynx.  Neck / Thyroid: Supple, no masses, nodes, nodules or enlargement.  Lymphatics: No abnormally enlarged lymph nodes.  Chest: Normal chest wall and respirations. Clear to auscultation.   Abdomen: abdomen is soft without significant tenderness, masses, organomegaly or guarding  Extremities: normal strength, tone, and muscle mass  Skin: normal. no rash or abnormalities  CNS: non focal.    Lab Results    Recent Results (from the past 168 hour(s))   Lipid panel reflex to direct LDL " Fasting   Result Value Ref Range    Cholesterol 265 (H) <200 mg/dL    Triglycerides 309 (H) <150 mg/dL    Direct Measure HDL 38 (L) >=50 mg/dL    LDL Cholesterol Calculated 165 (H) <=100 mg/dL    Non HDL Cholesterol 227 (H) <130 mg/dL    Patient Fasting > 8hrs? Yes        Imaging Results    XR Bone Survey Complete    Result Date: 8/23/2024  EXAM: XR BONE SURVEY COMPLETE LOCATION: [facility] DATE: 8/22/2024 INDICATION:  MGUS (monoclonal gammopathy of unknown significance) COMPARISON: None.     IMPRESSION: No discrete lytic osseous lesions. Spinal spondylosis. Mild degenerative arthrosis both hips and both knees. Calcific densities over the right upper quadrant which may reflect small gallstones.      The longitudinal plan of care for the diagnosis(es)/condition(s) as documented were addressed during this visit. Due to the added complexity in care, I will continue to support Viki in the subsequent management and with ongoing continuity of care.     45 minutes spent by me on the date of the encounter doing chart review, history and exam, documentation and further activities as noted above.    Signed by: Monica Urias MD

## 2025-01-13 NOTE — PROGRESS NOTES
NEUROLOGY FOLLOW UP VISIT  NOTE       [facility] NEUROLOG[facility] [place]  [address] [place] [address]  Tel: [phone]  Fax: [phone]  [email or web address]     Viki Robert,  1940, MRN [id number]  PCP: Nura Boland  Date: 2025      ASSESSMENT & PLAN     Visit Diagnosis  Polyneuropathy, peripheral sensorimotor axonal  MGUS (monoclonal gammopathy of unknown significance)  Prediabetes     Sensorimotor polyneuropathy (MGUS, prediabetes)  Pleasant 84-year-old female with history of CKD stage III, vitamin D deficiency, HLD, IFG, HTN and gout who was evaluated in  for generalized weakness and found to have peripheral neuropathy and cervical spine stenosis who was previously seen in our clinic in  and  for early neuropathy was reevaluated on 3/27/2024 with complaint of worsening gait.  She had a repeat EMG that is borderline normal due to borderline CMAP and conduction velocities.  Lab work for common causes of neuropathy was normal except for elevated hemoglobin A1c and monoclonal gammopathy.  She also had hyperreflexia and MRI cervical spine was done that showed multilevel degenerative changes with moderate stenosis at C3-C4 and C6-C7 incidentally thyroid lesion was noted and subsequently ultrasound was done.  She was told her neuropathy likely is due to prediabetes and MGUS.  She is being followed by hematology and I have encouraged her to have a tighter glycemic control.  She will continue with home exercises.  At present no further intervention is needed.  Follow-up will be on as needed basis.    Thank you again for this referral, please feel free to contact me if you have any questions.    Rigoberto Lund MD  [facility]     HISTORY OF PRESENT ILLNESS     Patient is 84-year-old female with CKD stage III, vitamin D deficiency, HLD, IFG, HTN, statin myopathy and gout who was initially evaluated in  for generalized weakness  "and found to have peripheral neuropathy and cervical spine stenosis who returns for follow-up.  She was last seen on 7/3/2024 when she complained  of worsening gait. She had a repeat EMG that was borderline normal due to borderline CMAP and conduction velocities.  Lab work for common causes of neuropathy was normal except for elevated hemoglobin A1c and monoclonal gammopathy.  She also had hyperreflexia and MRI cervical spine was done that showed multilevel degenerative changes with moderate stenosis at C3-C4 and C6-C7 incidentally thyroid lesion was noted and subsequently ultrasound and biopsy was done that was consistent with a benign follicular nodule.  She was referred to oncology for MGUS but they did not recommend any additional workup and patient was told to follow-up yearly basis for surveillance.  She was also referred for physical therapy however she admits she \"dropped out\" after few visits as it was causing more discomfort.    Patient was previously seen in our clinic in 2015 when she had reported that she had poor stamina for a long time but in 2015 had noticed a marked decrease in her strength with weakness and inability to stand up from a sitting position and shortness of breath along with decreased balance and leg pain.  She preferred her legs dangling off the bed and inability to stand for even a short period of time.  She had a evaluation by cardiology at that time and her EKG, FAITH and cardiac exam were normal.  She also had PFTs done that were normal.  Prior to that presentation in 2010 she was evaluated in our clinic also for loss of taste and smell and had MRI of the brain that was unremarkable.  Although she has history of statin myopathy she had been off statin for multiple years.  After her last visit in 2015 she had MRI of cervical spine that showed moderate C3-C4 through C6/C7 stenosis and multilevel neuroforaminal stenosis and lumbar spine showed moderate degenerative changes with " neuroforaminal stenosis.  EMG was also done that suggested early mixed sensorimotor polyneuropathy and there were findings to suggest early myopathy.  Lab work for common causes of neuropathy included MGUS and a borderline elevated hemoglobin A1c.  Hematology recommended surveillance     PROBLEM LIST   Patient Active Problem List   Diagnosis    Essential hypertension    Fibrocystic breast disease    Hyperlipidemia    PVC's (premature ventricular contractions)    Gout    Moderate C3-C4 to C6-C7 stenosis    Chronic kidney disease, stage 3b (H)    Polyneuropathy, peripheral sensorimotor axonal    MGUS (monoclonal gammopathy of unknown significance)    Cervical spondylosis without myelopathy    Impaired functional mobility, balance, gait, and endurance         PAST MEDICAL & SURGICAL HISTORY     Past Medical History:   Patient  has no past medical history on file.    Surgical History:  She  has a past surgical history that includes Biopsy breast (Left, 2000).     SOCIAL HISTORY     Reviewed, and she  reports that she has never smoked. She has never used smokeless tobacco. She reports that she does not use drugs.     FAMILY HISTORY     Reviewed, and family history includes Breast Cancer (age of onset: 100.00) in her paternal grandmother; Breast Cancer (age of onset: 90.00) in her mother; Multiple myeloma (age of onset: 75.00) in her father.     ALLERGIES     Allergies   Allergen Reactions    Atorvastatin Unknown     Achy & weak    Hydrochlorothiazide Unknown     Light headed    Lodine [Etodolac] Unknown    Naprosyn [Naproxen] Unknown     Ankle swelling    Pravachol [Pravastatin] Unknown     Muscle aches & weakness    Statins [Statins] Muscle Pain (Myalgia)         REVIEW OF SYSTEMS     A 12 point review of system was performed and was negative except as outlined in the history of present illness.     HOME MEDICATIONS     Current Outpatient Rx   Medication Sig Dispense Refill    allopurinol (ZYLOPRIM) 100 MG tablet Take  "150 mg by mouth daily      aspirin 81 MG EC tablet [ASPIRIN 81 MG EC TABLET] Take 81 mg by mouth daily.      atenolol (TENORMIN) 50 MG tablet Take 50 mg by mouth 2 times daily      lansoprazole (PREVACID) 15 MG capsule [LANSOPRAZOLE (PREVACID) 15 MG CAPSULE] Take 15 mg by mouth daily.      losartan (COZAAR) 100 MG tablet TAKE ONE TABLET BY MOUTH ONCE DAILY 90 tablet 2    multivitamin (ONE A DAY) per tablet Take 1 tablet by mouth daily           PHYSICAL EXAM     Vital signs  BP (!) 147/78 (BP Location: Right arm, Patient Position: Sitting)   Pulse 69   Ht 1.727 m (5' 8\")   Wt 88.5 kg (195 lb)   BMI 29.65 kg/m      Weight:   195 lbs 0 oz    Pleasant elderly female who is alert and oriented vital signs are reviewed and documented in electronic medical record. Neck supple. Neurologically speech was normal. Cranial nerves II through XII are intact motor strength 5/5 except proximally in the lower extremity 4+/5 reflexes are 3+ questionable upgoing toe on the right equivocal toe on the left. Sensation decreased to light touch pinprick vibratory sensation below knees bilaterally. She walks with a walker. No dysmetria noted on finger-nose testing.      PERTINENT DIAGNOSTIC STUDIES     Following studies were reviewed:     MRI CERVICAL SPINE 4/6/2024  1.  Multilevel degenerative changes as detailed above. No cord signal abnormality. Please see above discussion for level specific findings.  2.  Thyroid lesions partially identified on this examination, recommend dedicated thyroid ultrasound.     MRI CERVICAL SPINE 12/13/2015  1.  Moderate multilevel degenerative changes in the cervical spine, as detailed.  2.  C3-C4 through C6-C7 mild to moderate central canal stenosis.  3.  Mild multilevel foraminal narrowing, as detailed. C6-C7 mild to moderate foraminal narrowing.  4.  Equivocal tiny focus of T2 signal hyperintensity in the left aspect of the cord at the C5 vertebral body level, nonspecific.     MRI LUMBAR SPINE " 12/13/2015  1.  Mild to moderate degenerative changes in the lumbar spine, as detailed.  2.  L4-L5 disc osteophyte complex and moderate to severe facet arthropathy. Mild to moderate foraminal narrowing. Central canal remains adequately patent.  3.  L5-S1 disc osteophyte complex with moderate left and mild right foraminal narrowing. Contact of the exiting left L5 nerve root. Central canal remains adequately patent.     MRI BRAIN 7/13/2010  1.  There are few small scattered foci of nonspecific T2/FLAIR hyperintensity in the cerebral white matter which are nonspecific, but could represent chronic small vessel ischemic change.  2.  The head MRI otherwise is normal.     EMG 7/3/2024  This is a borderline normal nerve conduction and EMG study of lower extremity.  Please note normal EMG study does not rule out the possibility of small fiber neuropathy.  Clinical correlation is recommended.     EMG 2/18/2015  This is an abnormal nerve conduction and EMG study of lower extremity that suggests early sensory motor predominantly axonal polyneuropathy.  In addition, there is evidence of possible early myopathy, clinical correlation is recommended.     ECHOCARDIOGRAM 10/4/2022  1. The left ventricle is normal in size. Left ventricular function is  normal.The ejection fraction is 55-60%. There is normal left ventricular wall  thickness. Left ventricular diastolic function is abnormal. No regional wall  motion abnormalities noted.  2. Normal right ventricle size and systolic function.  3. No hemodynamically significant valvular abnormalities on 2D or color flow  imaging.     HOLTER MONITOR 4/14/2023       ULTRASOUND THYROID 6/14/2024  1. Left mid TR 4 nodule (1.7 cm) meets criteria for FNA.     PERTINENT LABS  Following labs were reviewed:  No visits with results within 3 Month(s) from this visit.   Latest known visit with results is:   Lab on 09/05/2024   Component Date Value Ref Range Status    Cholesterol 09/05/2024 265 (H)   <200 mg/dL Final    Triglycerides 09/05/2024 309 (H)  <150 mg/dL Final    Direct Measure HDL 09/05/2024 38 (L)  >=50 mg/dL Final    LDL Cholesterol Calculated 09/05/2024 165 (H)  <=100 mg/dL Final    Non HDL Cholesterol 09/05/2024 227 (H)  <130 mg/dL Final    Patient Fasting > 8hrs? 09/05/2024 Yes   Final         Total time spent for face to face visit, reviewing labs/imaging studies, counseling and coordination of care was: 30 Minutes spent on the date of the encounter doing chart review, review of outside records, review of test results, interpretation of tests, patient visit, and documentation     The longitudinal plan of care for the diagnosis(es)/condition(s) as documented were addressed during this visit. Due to the added complexity in care, I will continue to support Viki in the subsequent management and with ongoing continuity of care.    This note was dictated using voice recognition software.  Any grammatical or context distortions are unintentional and inherent to the software.    No orders of the defined types were placed in this encounter.     New Prescriptions    No medications on file     Modified Medications    No medications on file

## 2025-01-22 ENCOUNTER — OFFICE VISIT (OUTPATIENT)
Dept: NEUROLOGY | Facility: CLINIC | Age: 85
End: 2025-01-22
Payer: COMMERCIAL

## 2025-01-22 VITALS
SYSTOLIC BLOOD PRESSURE: 147 MMHG | WEIGHT: 195 LBS | HEART RATE: 69 BPM | HEIGHT: 68 IN | BODY MASS INDEX: 29.55 KG/M2 | DIASTOLIC BLOOD PRESSURE: 78 MMHG

## 2025-01-22 DIAGNOSIS — G60.8 POLYNEUROPATHY, PERIPHERAL SENSORIMOTOR AXONAL: Primary | ICD-10-CM

## 2025-01-22 DIAGNOSIS — D47.2 MGUS (MONOCLONAL GAMMOPATHY OF UNKNOWN SIGNIFICANCE): ICD-10-CM

## 2025-01-22 DIAGNOSIS — R73.03 PREDIABETES: ICD-10-CM

## 2025-01-22 PROCEDURE — 99213 OFFICE O/P EST LOW 20 MIN: CPT | Performed by: PSYCHIATRY & NEUROLOGY

## 2025-01-22 PROCEDURE — G2211 COMPLEX E/M VISIT ADD ON: HCPCS | Performed by: PSYCHIATRY & NEUROLOGY

## 2025-01-22 NOTE — NURSING NOTE
"Chief Complaint   Patient presents with    NEUROPATHY     6 month follow up- patient reports she did 7 session of PT and was a \"drop out\" as it was causing her too much trouble      Traci GALEANO CMA on 1/22/2025 at 1:48 PM  [facility]     "

## 2025-01-22 NOTE — LETTER
2025      Viki Robert  [address] [place] [address]      Dear Colleague,    Thank you for referring your patient, Viki Robert, to the [facility] [place]. Please see a copy of my visit note below.    NEUROLOGY FOLLOW UP VISIT  NOTE       [facility] [place]  [address] [place] [address]  Tel: [phone]  Fax: [phone]  [email or web address]     Viki Robert,  1940, MRN [id number]  PCP: Nura Boland  Date: 2025      ASSESSMENT & PLAN     Visit Diagnosis  Polyneuropathy, peripheral sensorimotor axonal  MGUS (monoclonal gammopathy of unknown significance)  Prediabetes     Sensorimotor polyneuropathy (MGUS, prediabetes)  Pleasant 84-year-old female with history of CKD stage III, vitamin D deficiency, HLD, IFG, HTN and gout who was evaluated in  for generalized weakness and found to have peripheral neuropathy and cervical spine stenosis who was previously seen in our clinic in  and  for early neuropathy was reevaluated on 3/27/2024 with complaint of worsening gait.  She had a repeat EMG that is borderline normal due to borderline CMAP and conduction velocities.  Lab work for common causes of neuropathy was normal except for elevated hemoglobin A1c and monoclonal gammopathy.  She also had hyperreflexia and MRI cervical spine was done that showed multilevel degenerative changes with moderate stenosis at C3-C4 and C6-C7 incidentally thyroid lesion was noted and subsequently ultrasound was done.  She was told her neuropathy likely is due to prediabetes and MGUS.  She is being followed by hematology and I have encouraged her to have a tighter glycemic control.  She will continue with home exercises.  At present no further intervention is needed.  Follow-up will be on as needed basis.    Thank you again for this referral, please feel free to contact me if you have any questions.    MD WALESKA Kelly  [facility]     HISTORY OF PRESENT ILLNESS     Patient is 84-year-old female with CKD stage III, vitamin D deficiency, HLD, IFG, HTN, statin myopathy and gout who was initially evaluated in 2015 for generalized weakness and found to have peripheral neuropathy and cervical spine stenosis who returns for follow-up.  She was last seen on 7/3/2024 when she complained  of worsening gait. She had a repeat EMG that was borderline normal due to borderline CMAP and conduction velocities.  Lab work for common causes of neuropathy was normal except for elevated hemoglobin A1c and monoclonal gammopathy.  She also had hyperreflexia and MRI cervical spine was done that showed multilevel degenerative changes with moderate stenosis at C3-C4 and C6-C7 incidentally thyroid lesion was noted and subsequently ultrasound and biopsy was done that was consistent with a benign follicular nodule.  She was referred to oncology for MGUS but they did not recommend any additional workup and patient was told to follow-up yearly basis for surveillance.  She was also referred for physical therapy however she admits she \"dropped out\" after few visits as it was causing more discomfort.    Patient was previously seen in our clinic in 2015 when she had reported that she had poor stamina for a long time but in 2015 had noticed a marked decrease in her strength with weakness and inability to stand up from a sitting position and shortness of breath along with decreased balance and leg pain.  She preferred her legs dangling off the bed and inability to stand for even a short period of time.  She had a evaluation by cardiology at that time and her EKG, FAITH and cardiac exam were normal.  She also had PFTs done that were normal.  Prior to that presentation in 2010 she was evaluated in our clinic also for loss of taste and smell and had MRI of the brain that was unremarkable.  Although she has history of statin myopathy she had been " off statin for multiple years.  After her last visit in 2015 she had MRI of cervical spine that showed moderate C3-C4 through C6/C7 stenosis and multilevel neuroforaminal stenosis and lumbar spine showed moderate degenerative changes with neuroforaminal stenosis.  EMG was also done that suggested early mixed sensorimotor polyneuropathy and there were findings to suggest early myopathy.  Lab work for common causes of neuropathy included MGUS and a borderline elevated hemoglobin A1c.  Hematology recommended surveillance     PROBLEM LIST   Patient Active Problem List   Diagnosis     Essential hypertension     Fibrocystic breast disease     Hyperlipidemia     PVC's (premature ventricular contractions)     Gout     Moderate C3-C4 to C6-C7 stenosis     Chronic kidney disease, stage 3b (H)     Polyneuropathy, peripheral sensorimotor axonal     MGUS (monoclonal gammopathy of unknown significance)     Cervical spondylosis without myelopathy     Impaired functional mobility, balance, gait, and endurance         PAST MEDICAL & SURGICAL HISTORY     Past Medical History:   Patient  has no past medical history on file.    Surgical History:  She  has a past surgical history that includes Biopsy breast (Left, 2000).     SOCIAL HISTORY     Reviewed, and she  reports that she has never smoked. She has never used smokeless tobacco. She reports that she does not use drugs.     FAMILY HISTORY     Reviewed, and family history includes Breast Cancer (age of onset: 100.00) in her paternal grandmother; Breast Cancer (age of onset: 90.00) in her mother; Multiple myeloma (age of onset: 75.00) in her father.     ALLERGIES     Allergies   Allergen Reactions     Atorvastatin Unknown     Achy & weak     Hydrochlorothiazide Unknown     Light headed     Lodine [Etodolac] Unknown     Naprosyn [Naproxen] Unknown     Ankle swelling     Pravachol [Pravastatin] Unknown     Muscle aches & weakness     Statins [Statins] Muscle Pain (Myalgia)      "    REVIEW OF SYSTEMS     A 12 point review of system was performed and was negative except as outlined in the history of present illness.     HOME MEDICATIONS     Current Outpatient Rx   Medication Sig Dispense Refill     allopurinol (ZYLOPRIM) 100 MG tablet Take 150 mg by mouth daily       aspirin 81 MG EC tablet [ASPIRIN 81 MG EC TABLET] Take 81 mg by mouth daily.       atenolol (TENORMIN) 50 MG tablet Take 50 mg by mouth 2 times daily       lansoprazole (PREVACID) 15 MG capsule [LANSOPRAZOLE (PREVACID) 15 MG CAPSULE] Take 15 mg by mouth daily.       losartan (COZAAR) 100 MG tablet TAKE ONE TABLET BY MOUTH ONCE DAILY 90 tablet 2     multivitamin (ONE A DAY) per tablet Take 1 tablet by mouth daily           PHYSICAL EXAM     Vital signs  BP (!) 147/78 (BP Location: Right arm, Patient Position: Sitting)   Pulse 69   Ht 1.727 m (5' 8\")   Wt 88.5 kg (195 lb)   BMI 29.65 kg/m      Weight:   195 lbs 0 oz    Pleasant elderly female who is alert and oriented vital signs are reviewed and documented in electronic medical record. Neck supple. Neurologically speech was normal. Cranial nerves II through XII are intact motor strength 5/5 except proximally in the lower extremity 4+/5 reflexes are 3+ questionable upgoing toe on the right equivocal toe on the left. Sensation decreased to light touch pinprick vibratory sensation below knees bilaterally. She walks with a walker. No dysmetria noted on finger-nose testing.      PERTINENT DIAGNOSTIC STUDIES     Following studies were reviewed:     MRI CERVICAL SPINE 4/6/2024  1.  Multilevel degenerative changes as detailed above. No cord signal abnormality. Please see above discussion for level specific findings.  2.  Thyroid lesions partially identified on this examination, recommend dedicated thyroid ultrasound.     MRI CERVICAL SPINE 12/13/2015  1.  Moderate multilevel degenerative changes in the cervical spine, as detailed.  2.  C3-C4 through C6-C7 mild to moderate central " canal stenosis.  3.  Mild multilevel foraminal narrowing, as detailed. C6-C7 mild to moderate foraminal narrowing.  4.  Equivocal tiny focus of T2 signal hyperintensity in the left aspect of the cord at the C5 vertebral body level, nonspecific.     MRI LUMBAR SPINE 12/13/2015  1.  Mild to moderate degenerative changes in the lumbar spine, as detailed.  2.  L4-L5 disc osteophyte complex and moderate to severe facet arthropathy. Mild to moderate foraminal narrowing. Central canal remains adequately patent.  3.  L5-S1 disc osteophyte complex with moderate left and mild right foraminal narrowing. Contact of the exiting left L5 nerve root. Central canal remains adequately patent.     MRI BRAIN 7/13/2010  1.  There are few small scattered foci of nonspecific T2/FLAIR hyperintensity in the cerebral white matter which are nonspecific, but could represent chronic small vessel ischemic change.  2.  The head MRI otherwise is normal.     EMG 7/3/2024  This is a borderline normal nerve conduction and EMG study of lower extremity.  Please note normal EMG study does not rule out the possibility of small fiber neuropathy.  Clinical correlation is recommended.     EMG 2/18/2015  This is an abnormal nerve conduction and EMG study of lower extremity that suggests early sensory motor predominantly axonal polyneuropathy.  In addition, there is evidence of possible early myopathy, clinical correlation is recommended.     ECHOCARDIOGRAM 10/4/2022  1. The left ventricle is normal in size. Left ventricular function is  normal.The ejection fraction is 55-60%. There is normal left ventricular wall  thickness. Left ventricular diastolic function is abnormal. No regional wall  motion abnormalities noted.  2. Normal right ventricle size and systolic function.  3. No hemodynamically significant valvular abnormalities on 2D or color flow  imaging.     HOLTER MONITOR 4/14/2023       ULTRASOUND THYROID 6/14/2024  1. Left mid TR 4 nodule (1.7 cm)  meets criteria for FNA.     PERTINENT LABS  Following labs were reviewed:  No visits with results within 3 Month(s) from this visit.   Latest known visit with results is:   Lab on 09/05/2024   Component Date Value Ref Range Status     Cholesterol 09/05/2024 265 (H)  <200 mg/dL Final     Triglycerides 09/05/2024 309 (H)  <150 mg/dL Final     Direct Measure HDL 09/05/2024 38 (L)  >=50 mg/dL Final     LDL Cholesterol Calculated 09/05/2024 165 (H)  <=100 mg/dL Final     Non HDL Cholesterol 09/05/2024 227 (H)  <130 mg/dL Final     Patient Fasting > 8hrs? 09/05/2024 Yes   Final         Total time spent for face to face visit, reviewing labs/imaging studies, counseling and coordination of care was: 30 Minutes spent on the date of the encounter doing chart review, review of outside records, review of test results, interpretation of tests, patient visit, and documentation     The longitudinal plan of care for the diagnosis(es)/condition(s) as documented were addressed during this visit. Due to the added complexity in care, I will continue to support Viki in the subsequent management and with ongoing continuity of care.    This note was dictated using voice recognition software.  Any grammatical or context distortions are unintentional and inherent to the software.    No orders of the defined types were placed in this encounter.     New Prescriptions    No medications on file     Modified Medications    No medications on file                 Again, thank you for allowing me to participate in the care of your patient.        Sincerely,        Rigoberto Ludn MD    Electronically signed

## 2025-03-12 ENCOUNTER — HOSPITAL ENCOUNTER (OUTPATIENT)
Dept: MAMMOGRAPHY | Facility: CLINIC | Age: 85
Discharge: HOME | End: 2025-03-12
Attending: FAMILY MEDICINE
Payer: COMMERCIAL

## 2025-03-12 DIAGNOSIS — Z12.31 VISIT FOR SCREENING MAMMOGRAM: ICD-10-CM

## 2025-03-12 PROCEDURE — 77063 BREAST TOMOSYNTHESIS BI: CPT

## 2025-03-13 DIAGNOSIS — I10 BENIGN ESSENTIAL HYPERTENSION: ICD-10-CM

## 2025-03-13 RX ORDER — LOSARTAN POTASSIUM 100 MG/1
100 TABLET ORAL DAILY
Qty: 90 TABLET | Refills: 0 | Status: SHIPPED | OUTPATIENT
Start: 2025-03-13

## 2025-03-15 ENCOUNTER — HEALTH MAINTENANCE LETTER (OUTPATIENT)
Age: 85
End: 2025-03-15

## 2025-07-03 ENCOUNTER — TELEPHONE (OUTPATIENT)
Dept: CARDIOLOGY | Facility: CLINIC | Age: 85
End: 2025-07-03
Payer: COMMERCIAL

## 2025-07-03 DIAGNOSIS — I10 BENIGN ESSENTIAL HYPERTENSION: ICD-10-CM

## 2025-07-03 RX ORDER — LOSARTAN POTASSIUM 100 MG/1
100 TABLET ORAL DAILY
Qty: 90 TABLET | Refills: 0 | Status: SHIPPED | OUTPATIENT
Start: 2025-07-03

## 2025-07-03 NOTE — TELEPHONE ENCOUNTER
[facility] Call Center    Phone Message    May a detailed message be left on voicemail: yes     Reason for Call: Medication Refill Request    Has the patient contacted the pharmacy for the refill? Yes   Name of medication being requested: Losartan   Provider who prescribed the medication: Dr. Ortiz   Pharmacy:    [facility] [address] [place] [address]  AT [facility]    Date medication is needed: 7/3/2025   Pt would like to change her pharmacy to    [facility] [address] [place] [address]  AT [facility]      Action Taken: Other: Cardio     Travel Screening: Not Applicable     Date of Service:                  Thank you!  [facility]

## 2025-08-26 ENCOUNTER — LAB (OUTPATIENT)
Dept: INFUSION THERAPY | Facility: HOSPITAL | Age: 85
End: 2025-08-26
Attending: INTERNAL MEDICINE
Payer: COMMERCIAL

## 2025-08-26 DIAGNOSIS — D47.2 MGUS (MONOCLONAL GAMMOPATHY OF UNKNOWN SIGNIFICANCE): ICD-10-CM

## 2025-08-26 LAB
ALBUMIN SERPL BCG-MCNC: 4.1 G/DL (ref 3.5–5.2)
ALP SERPL-CCNC: 97 U/L (ref 40–150)
ALT SERPL W P-5'-P-CCNC: 20 U/L (ref 0–50)
ANION GAP SERPL CALCULATED.3IONS-SCNC: 12 MMOL/L (ref 7–15)
AST SERPL W P-5'-P-CCNC: 25 U/L (ref 0–45)
BASOPHILS # BLD AUTO: 0.1 10E3/UL (ref 0–0.2)
BASOPHILS NFR BLD AUTO: 1.2 %
BILIRUB SERPL-MCNC: 0.4 MG/DL
BUN SERPL-MCNC: 17.5 MG/DL (ref 8–23)
CALCIUM SERPL-MCNC: 9.9 MG/DL (ref 8.8–10.4)
CHLORIDE SERPL-SCNC: 107 MMOL/L (ref 98–107)
CREAT SERPL-MCNC: 1.17 MG/DL (ref 0.51–0.95)
EGFRCR SERPLBLD CKD-EPI 2021: 46 ML/MIN/1.73M2
EOSINOPHIL # BLD AUTO: 0.19 10E3/UL (ref 0–0.7)
EOSINOPHIL NFR BLD AUTO: 2.3 %
ERYTHROCYTE [DISTWIDTH] IN BLOOD BY AUTOMATED COUNT: 13.5 % (ref 10–15)
GLUCOSE SERPL-MCNC: 102 MG/DL (ref 70–99)
HCO3 SERPL-SCNC: 22 MMOL/L (ref 22–29)
HCT VFR BLD AUTO: 41.9 % (ref 35–47)
HGB BLD-MCNC: 14.3 G/DL (ref 11.7–15.7)
IMM GRANULOCYTES # BLD: <0.03 10E3/UL
IMM GRANULOCYTES NFR BLD: 0.2 %
LYMPHOCYTES # BLD AUTO: 2.72 10E3/UL (ref 0.8–5.3)
LYMPHOCYTES NFR BLD AUTO: 33.6 %
MCH RBC QN AUTO: 31.6 PG (ref 26.5–33)
MCHC RBC AUTO-ENTMCNC: 34.1 G/DL (ref 31.5–36.5)
MCV RBC AUTO: 92.5 FL (ref 78–100)
MONOCYTES # BLD AUTO: 0.76 10E3/UL (ref 0–1.3)
MONOCYTES NFR BLD AUTO: 9.4 %
NEUTROPHILS # BLD AUTO: 4.31 10E3/UL (ref 1.6–8.3)
NEUTROPHILS NFR BLD AUTO: 53.3 %
NRBC # BLD AUTO: <0.03 10E3/UL
NRBC BLD AUTO-RTO: 0 /100
PLATELET # BLD AUTO: 225 10E3/UL (ref 150–450)
POTASSIUM SERPL-SCNC: 4.5 MMOL/L (ref 3.4–5.3)
PROT SERPL-MCNC: 6.5 G/DL (ref 6.4–8.3)
PROT SERPL-MCNC: 7 G/DL (ref 6.4–8.3)
RBC # BLD AUTO: 4.53 10E6/UL (ref 3.8–5.2)
SODIUM SERPL-SCNC: 141 MMOL/L (ref 135–145)
WBC # BLD AUTO: 8.1 10E3/UL (ref 4–11)

## 2025-08-26 PROCEDURE — 36415 COLL VENOUS BLD VENIPUNCTURE: CPT

## 2025-08-26 PROCEDURE — 84155 ASSAY OF PROTEIN SERUM: CPT

## 2025-08-26 PROCEDURE — 85004 AUTOMATED DIFF WBC COUNT: CPT

## 2025-08-26 PROCEDURE — 83521 IG LIGHT CHAINS FREE EACH: CPT

## 2025-08-26 PROCEDURE — 82784 ASSAY IGA/IGD/IGG/IGM EACH: CPT

## 2025-08-27 LAB
IGA SERPL-MCNC: 278 MG/DL (ref 84–499)
IGG SERPL-MCNC: 722 MG/DL (ref 610–1616)
IGM SERPL-MCNC: 88 MG/DL (ref 35–242)
KAPPA LC FREE SER-MCNC: 2.83 MG/DL (ref 0.33–1.94)
KAPPA LC FREE/LAMBDA FREE SER NEPH: 1.51 {RATIO} (ref 0.26–1.65)
LAMBDA LC FREE SERPL-MCNC: 1.88 MG/DL (ref 0.57–2.63)

## 2025-09-02 LAB
ALBUMIN SERPL ELPH-MCNC: 3.8 G/DL (ref 3.7–5.1)
ALPHA1 GLOB SERPL ELPH-MCNC: 0.2 G/DL (ref 0.2–0.4)
ALPHA2 GLOB SERPL ELPH-MCNC: 0.9 G/DL (ref 0.5–0.9)
B-GLOBULIN SERPL ELPH-MCNC: 0.9 G/DL (ref 0.6–1)
GAMMA GLOB SERPL ELPH-MCNC: 0.7 G/DL (ref 0.7–1.6)
M PROTEIN SERPL ELPH-MCNC: 0.1 G/DL
PROT PATTERN SERPL ELPH-IMP: ABNORMAL